# Patient Record
Sex: FEMALE | Race: WHITE | Employment: OTHER | RURAL
[De-identification: names, ages, dates, MRNs, and addresses within clinical notes are randomized per-mention and may not be internally consistent; named-entity substitution may affect disease eponyms.]

---

## 2020-01-15 ENCOUNTER — OFFICE VISIT (OUTPATIENT)
Dept: CARDIOLOGY CLINIC | Age: 75
End: 2020-01-15

## 2020-01-15 VITALS
RESPIRATION RATE: 14 BRPM | OXYGEN SATURATION: 98 % | SYSTOLIC BLOOD PRESSURE: 120 MMHG | DIASTOLIC BLOOD PRESSURE: 70 MMHG | HEART RATE: 80 BPM | WEIGHT: 129 LBS | HEIGHT: 62 IN | BODY MASS INDEX: 23.74 KG/M2

## 2020-01-15 DIAGNOSIS — I07.1 RHEUMATIC TRICUSPID VALVE REGURGITATION: ICD-10-CM

## 2020-01-15 DIAGNOSIS — Z98.890 S/P TVR (TRICUSPID VALVE REPAIR): ICD-10-CM

## 2020-01-15 DIAGNOSIS — Z95.2 S/P MVR (MITRAL VALVE REPLACEMENT): ICD-10-CM

## 2020-01-15 DIAGNOSIS — I05.9 MITRAL VALVE DISEASE: Primary | ICD-10-CM

## 2020-01-15 DIAGNOSIS — I48.0 PAF (PAROXYSMAL ATRIAL FIBRILLATION) (HCC): ICD-10-CM

## 2020-01-15 DIAGNOSIS — R00.2 PALPITATIONS: ICD-10-CM

## 2020-01-15 RX ORDER — METOPROLOL SUCCINATE 25 MG/1
25 TABLET, EXTENDED RELEASE ORAL DAILY
COMMUNITY
Start: 2019-10-16 | End: 2020-10-15 | Stop reason: SDUPTHER

## 2020-01-15 RX ORDER — METOPROLOL SUCCINATE 50 MG/1
50 TABLET, EXTENDED RELEASE ORAL DAILY
COMMUNITY
Start: 2019-12-04 | End: 2020-07-15 | Stop reason: DRUGHIGH

## 2020-01-15 RX ORDER — ASPIRIN 81 MG/1
TABLET ORAL DAILY
COMMUNITY
End: 2020-10-15

## 2020-01-15 RX ORDER — LEVOTHYROXINE SODIUM 112 UG/1
112 TABLET ORAL DAILY
COMMUNITY
Start: 2019-12-04

## 2020-01-15 NOTE — PROGRESS NOTES
Verified patient with two patient identifiers. Medications reviewed/approved by Dr. Reyes Nielsen. Chief Complaint   Patient presents with    Valvular Heart Disease     1. Have you been to the ER, urgent care clinic since your last visit? Hospitalized since your last visit? new pt.     2. Have you seen or consulted any other health care providers outside of the [address] [place] [address] since your last visit? Include any pap smears or colon screening. New pt.

## 2020-01-15 NOTE — PROGRESS NOTES
Nixon Jones is a 69 y.o. female is here to establish local cardiac care. Hx rheumatic fever as child, RHD with mod-severe /MR w/ prolapse, TR mod-severe)--s/p MV replacement (Geovany Kennedyun #27 pericardial) + Tricuspid valve repair (annuloplasty ring)--!0/2/19 at [facility] (Dr Little Dave), transient periop PAF--to NSR, seen in f/u by Dr. Chelsea Vicente (Cardiology)--on warfarin x 3 mos, and ASA 81. Some tachy/palpitations and started low dose Toprol XL 25, increased to 75mg (sinus tachy). Echo 10/16/19 post op with LVEF 60, mild LAE, stable MV prosthesis and tricuspid annuloplasty. Occasional palpitations. Had 30 day Event monitor (returned on 12/30 but no results available currently to r/o afib and determine any need for ongoing warfarin/anticoag). The patient denies chest pain/ shortness of breath, orthopnea, PND, LE edema, syncope, presyncope or fatigue. 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 History reviewed. No pertinent family history.    Social History     Socioeconomic History    Marital status:      Spouse name: Not on file    Number of children: Not on file    Years of education: Not on file    Highest education level: Not on file   Occupational History    Not on file   Social Needs    Financial resource strain: Not on file    Food insecurity:     Worry: Not on file     Inability: Not on file    Transportation needs:     Medical: Not on file     Non-medical: Not on file   Tobacco Use    Smoking status: Never Smoker    Smokeless tobacco: Never Used   Substance and Sexual Activity    Alcohol use: Not on file    Drug use: Not on file    Sexual activity: Not on file   Lifestyle    Physical activity:     Days per week: Not on file     Minutes per session: Not on file    Stress: Not on file   Relationships    Social connections:     Talks on phone: Not on file     Gets together: Not on file     Attends Sabianist service: Not on file     Active member of club or organization: Not on file     Attends meetings of clubs or organizations: Not on file     Relationship status: Not on file    Intimate partner violence:     Fear of current or ex partner: Not on file     Emotionally abused: Not on file     Physically abused: Not on file     Forced sexual activity: Not on file   Other Topics Concern    Not on file   Social History Narrative    Not on file      Current Outpatient Medications   Medication Sig    aspirin delayed-release 81 mg tablet Take  by mouth daily.  metoprolol succinate (TOPROL-XL) 50 mg XL tablet Take 50 mg by mouth daily. Pt takes a 25 mg + 50 mg to equal 75 mg daily.  metoprolol succinate (TOPROL-XL) 25 mg XL tablet Take 25 mg by mouth daily. Pt takes a 25 mg + 50 mg to equal 75 mg daily.  SYNTHROID 100 mcg tablet Take 100 mcg by mouth daily.  warfarin sodium (WARFARIN PO) Take  by mouth daily. As directed per pcp. Alternates between 6 mg and 5 mg. No current facility-administered medications for this visit. Review of Symptoms:    CONST  No weight change.  No fever, chills, sweats    ENT No visual changes, URI sx, sore throat    CV  See HPI   RESP  No cough, or sputum, wheezing. Also see HPI   GI  No abdominal pain or change in bowel habits. No heartburn or dysphagia. No melena or rectal bleeding.   No dysuria, urgency, frequency, hematuria   MSKEL  No joint pain, swelling. No muscle pain. SKIN  No rash or lesions. NEURO  No headache, syncope, or seizure. No weakness, loss of sensation, or paresthesias. PSYCH  No low mood or depression  No anxiety. HE/LYMPH  No easy bruising, abnormal bleeding, or enlarged glands. Physical ExamPhysical Exam:    Visit Vitals  /70 (BP 1 Location: Left arm, BP Patient Position: Sitting)   Pulse 80   Resp 14   Ht 5' 2\" (1.575 m)   Wt 129 lb (58.5 kg)   SpO2 98% Comment: ra   BMI 23.59 kg/m²     Gen: NAD  HEENT:  PERRL, throat clear  Neck: no adenopathy, no thyromegaly, no JVD   Heart:  Regular,Nl S1S2,  no murmur, gallop or rub.   Lungs:  clear  Abdomen:   Soft, non-tender, bowel sounds are active.   Extremities:  No edema  Pulse: symmetric  Neuro: A&O times 3, No focal neuro deficits    Cardiographics    ECG: NSR, NST    Labs:   Lab Results   Component Value Date/Time    Sodium 142 09/24/2019 11:59 AM    Potassium 3.9 09/24/2019 11:59 AM    Chloride 104 09/24/2019 11:59 AM    CO2 29 09/24/2019 11:59 AM    Anion gap 9 09/24/2019 11:59 AM    Glucose 88 09/24/2019 11:59 AM    BUN 12 09/24/2019 11:59 AM    Creatinine 0.69 09/24/2019 11:59 AM    BUN/Creatinine ratio 17 09/24/2019 11:59 AM    GFR est AA >60 09/24/2019 11:59 AM    GFR est non-AA >60 09/24/2019 11:59 AM    Calcium 9.4 09/24/2019 11:59 AM     No results found for: CPK, CPKX, CPX  No results found for: CHOL, CHOLX, CHLST, CHOLV, 469391, HDL, HDLP, LDL, LDLC, DLDLP, TGLX, TRIGL, TRIGP, CHHD, CHHDX  No results found for this or any previous visit.     Assessment:         Patient Active Problem List    Diagnosis Date Noted    S/P MVR (mitral valve replacement) 01/15/2020    S/P TVR (tricuspid valve repair) 01/15/2020    PAF (paroxysmal atrial fibrillation) ([facility] 01/15/2020    Rheumatic heart disease     Mitral valve disease     Tricuspid regurgitation      69 y.o. female is here to establish local cardiac care. Hx rheumatic fever as child, RHD with mod-severe /MR w/ prolapse, TR mod-severe)--s/p MV replacement (Leatha Cue #27 pericardial) + Tricuspid valve repair (annuloplasty ring)--!0/2/19 at [facility] (Dr Sean Cardenas), transient periop PAF--to NSR, seen in f/u by Dr. Celestine Balderas (Cardiology)--on warfarin x 3 mos, and ASA 81. Some tachy/palpitations and started low dose Toprol XL 25, increased up to 75mg (sinus tachy). Echo 10/16/19 post op with LVEF 60, mild LAE, stable MV prosthesis and tricuspid annuloplasty. Occasional palpitations. Had 30 day Event monitor (returned on 12/30 but no results available currently to r/o afib and determine any need for ongoing warfarin/anticoag)     Plan:     Doing well with no adverse cardiac symptoms. Will f/u with her [facility] cardiologist on Monday to discuss Event monitor--if no afib planning to stop warfarin at 3 mos post-op, continue ASA 81  Reduce the Metoprolol XL to 50 (now on 75)  Is establishing local primary care  Continue current care and f/u in 6 months.     Logan Lipscomb MD

## 2020-01-15 NOTE — PATIENT INSTRUCTIONS
Please have your cardiology group in [place] fax over your event monitor results or you can drop them off at Dr. Conrado Latham office. FAX [phone] Please reduce your metoprolol to 50 mg daily.

## 2020-07-15 ENCOUNTER — OFFICE VISIT (OUTPATIENT)
Dept: CARDIOLOGY CLINIC | Age: 75
End: 2020-07-15

## 2020-07-15 VITALS
OXYGEN SATURATION: 98 % | DIASTOLIC BLOOD PRESSURE: 84 MMHG | BODY MASS INDEX: 23.74 KG/M2 | SYSTOLIC BLOOD PRESSURE: 126 MMHG | HEIGHT: 62 IN | HEART RATE: 76 BPM | TEMPERATURE: 95.3 F | RESPIRATION RATE: 16 BRPM | WEIGHT: 129 LBS

## 2020-07-15 DIAGNOSIS — Z98.890 S/P TVR (TRICUSPID VALVE REPAIR): ICD-10-CM

## 2020-07-15 DIAGNOSIS — I48.0 PAF (PAROXYSMAL ATRIAL FIBRILLATION) (HCC): ICD-10-CM

## 2020-07-15 DIAGNOSIS — Z95.2 S/P MVR (MITRAL VALVE REPLACEMENT): ICD-10-CM

## 2020-07-15 DIAGNOSIS — I09.9 RHEUMATIC HEART DISEASE: Primary | ICD-10-CM

## 2020-07-15 NOTE — PROGRESS NOTES
Tim Pascual is a 69 y.o. female is here for routine f/u. Hx rheumatic fever as child, RHD with mod-severe /MR w/ prolapse, TR mod-severe)--s/p MV replacement (Bridget Amos #27 pericardial) + Tricuspid valve repair (annuloplasty ring)--!0/2/19 at [facility] (Dr Jim Cuello), transient periop PAF--to NSR, seen in f/u by Dr. Marielle Chávez (Cardiology)--on warfarin x 3 mos, and ASA 81. Some tachy/palpitations and started low dose Toprol XL 25, increased up to 75mg (sinus tachy). Echo 10/16/19 post op with LVEF 60, mild LAE, stable MV prosthesis and tricuspid annuloplasty. Occasional palpitations. Had 30 day Event monitor (returned on 12/30) with PAT/PAF self terminating episodes up to 15 beats. She did see Dr. Marielle Chávez again in follow-up 5/15/20. Was asked to continue warfarin + ASA 81, slong with Metoprolol XL. Had discussed possible EP referral at time of last visit with Dr. Marielle Chávez. The patient denies chest pain/ shortness of breath, orthopnea, PND, LE edema, palpitations, syncope, presyncope or fatigue.        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 No family history on file. Social History     Socioeconomic History    Marital status:      Spouse name: Not on file    Number of children: Not on file    Years of education: Not on file    Highest education level: Not on file   Occupational History    Not on file   Social Needs    Financial resource strain: Not on file    Food insecurity     Worry: Not on file     Inability: Not on file    Transportation needs     Medical: Not on file     Non-medical: Not on file   Tobacco Use    Smoking status: Never Smoker    Smokeless tobacco: Never Used   Substance and Sexual Activity    Alcohol use: Not on file    Drug use: Not on file    Sexual activity: Not on file   Lifestyle    Physical activity     Days per week: Not on file     Minutes per session: Not on file    Stress: Not on file   Relationships    Social connections     Talks on phone: Not on file     Gets together: Not on file     Attends Church service: Not on file     Active member of club or organization: Not on file     Attends meetings of clubs or organizations: Not on file     Relationship status: Not on file    Intimate partner violence     Fear of current or ex partner: Not on file     Emotionally abused: Not on file     Physically abused: Not on file     Forced sexual activity: Not on file   Other Topics Concern    Not on file   Social History Narrative    Not on file      Current Outpatient Medications   Medication Sig    aspirin delayed-release 81 mg tablet Take  by mouth daily.  metoprolol succinate (TOPROL-XL) 25 mg XL tablet Take 25 mg by mouth daily.  SYNTHROID 100 mcg tablet Take 100 mcg by mouth daily.  warfarin sodium (WARFARIN PO) Take  by mouth daily. As directed per pcp. Alternates between 6 mg and 5 mg. No current facility-administered medications for this visit. Review of Symptoms:    CONST  No weight change.  No fever, chills, sweats    ENT No visual changes, URI sx, sore throat    CV  See HPI   RESP  No cough, or sputum, wheezing. Also see HPI   GI  No abdominal pain or change in bowel habits. No heartburn or dysphagia. No melena or rectal bleeding.   No dysuria, urgency, frequency, hematuria   MSKEL  No joint pain, swelling. No muscle pain. SKIN  No rash or lesions. NEURO  No headache, syncope, or seizure. No weakness, loss of sensation, or paresthesias. PSYCH  No low mood or depression  No anxiety. HE/LYMPH  No easy bruising, abnormal bleeding, or enlarged glands. Physical ExamPhysical Exam:    Visit Vitals  /84 (BP 1 Location: Left arm, BP Patient Position: Sitting)   Pulse 76   Temp (!) 95.3 °F (35.2 °C) (Temporal)   Resp 16   Ht 5' 2\" (1.575 m)   Wt 129 lb (58.5 kg)   SpO2 98% Comment: ra   BMI 23.59 kg/m²     Gen: NAD  HEENT:  PERRL, throat clear  Neck: no adenopathy, no thyromegaly, no JVD   Heart:  Regular,Nl S1S2,  I/VI murmur, no gallop or rubs  Lungs:  clear  Abdomen:   Soft, non-tender, bowel sounds are active. Extremities:  No edema  Pulse: symmetric  Neuro: A&O times 3, No focal neuro deficits    Cardiographics    ECG: NSR, borderline LAE, otherwise wnl    Labs:   Lab Results   Component Value Date/Time    Sodium 142 09/24/2019 11:59 AM    Potassium 3.9 09/24/2019 11:59 AM    Chloride 104 09/24/2019 11:59 AM    CO2 29 09/24/2019 11:59 AM    Anion gap 9 09/24/2019 11:59 AM    Glucose 88 09/24/2019 11:59 AM    BUN 12 09/24/2019 11:59 AM    Creatinine 0.69 09/24/2019 11:59 AM    BUN/Creatinine ratio 17 09/24/2019 11:59 AM    GFR est AA >60 09/24/2019 11:59 AM    GFR est non-AA >60 09/24/2019 11:59 AM    Calcium 9.4 09/24/2019 11:59 AM     No results found for: CPK, CPKX, CPX  No results found for: CHOL, CHOLX, CHLST, CHOLV, 334706, HDL, HDLP, LDL, LDLC, DLDLP, TGLX, TRIGL, TRIGP, CHHD, CHHDX  No results found for this or any previous visit.     Assessment:         Patient Active Problem List    Diagnosis Date Noted    S/P MVR (mitral valve replacement) 01/15/2020    S/P TVR (tricuspid valve repair) 01/15/2020    PAF (paroxysmal atrial fibrillation) ([address] 01/15/2020    Rheumatic heart disease     Mitral valve disease     Tricuspid regurgitation       Hx rheumatic fever as child, RHD with mod-severe /MR w/ prolapse, TR mod-severe)--s/p MV replacement (Carljenae David #27 pericardial) + Tricuspid valve repair (annuloplasty ring)--!0/2/19 at [facility] (Dr Marc Arevalo), transient periop PAF--to NSR, seen in f/u by Dr. Hermelinda Guerra (Cardiology)--on warfarin x 3 mos, and ASA 81. Some tachy/palpitations and started low dose Toprol XL 25, increased up to 75mg (sinus tachy). Echo 10/16/19 post op with LVEF 60, mild LAE, stable MV prosthesis and tricuspid annuloplasty. Occasional palpitations. Had 30 day Event monitor (returned on 12/30) with PAT/PAF self terminating episodes up to 15 beats. She did see Dr. Hermelinda Guerra again in follow-up 5/15/20. Was asked to continue warfarin + ASA 81, slong with Metoprolol XL. Had discussed possible EP referral at time of last visit with Dr. Hermelinda Guerra )? ILR vs other, PAF). Back down to 25mg of the Metoprolol XL. She did have a sleep study done 2/21/19 to r/o IJEOMA. Plan:     Discussed options re: PAF--minimal to no sx, CHADs2-VASC 3--favor long term anticoag and stopping ASA. Her Cardiologist in [place] had discussed possible EPS, Implantable loop. [employer] type devices also discussed. She is not currently interested in invasive EP procedure. May be candidate for Eliquis vs Xarelto to replace warfarin/ASA.     Abhi Solitario MD

## 2020-07-15 NOTE — PROGRESS NOTES
Verified patient with two patient identifiers. Medications reviewed/approved by Dr. Alcides Pinzon. 1. Have you been to the ER, urgent care clinic since your last visit? Hospitalized since your last visit? no    2. Have you seen or consulted any other health care providers outside of the [address] [place] [address] since your last visit? Include any pap smears or colon screening. Yes, DR. Grewal 5/15/2020 CARDIOLOGIST IN [place], [place]

## 2020-08-12 ENCOUNTER — TELEPHONE (OUTPATIENT)
Dept: CARDIOLOGY CLINIC | Age: 75
End: 2020-08-12

## 2020-08-12 NOTE — TELEPHONE ENCOUNTER
At [address] discussed with Dr. Amanda Hicks about coming off of the Warfarin and going onto Eliquis. She would like for someone to call her to get a script for the new medications either called or mailed into her pharmacy.  getts her medicine for [facility] in [facility] but she mentioned something about the cost so I am not sure if there is a mail order that she wants to used.   Please call [phone]

## 2020-08-13 NOTE — TELEPHONE ENCOUNTER
MD Ramana Nichole LPN    Caller: Unspecified (Yesterday,  3:50 PM)               Eliquis 5mg bid---D: 180 R:1 can send rx's.  Hold warfarin 2 days prior to starting. [facility]    Verbal order per Dr. Judit Silva. Order (medication, dose, route, frequency, amount, refills) repeated and verified twice. Spoke with the patient. Verified patient with two patient identifiers. Medication order given and questions answered. Patient verbalized understanding.

## 2020-10-15 ENCOUNTER — OFFICE VISIT (OUTPATIENT)
Dept: CARDIOLOGY CLINIC | Age: 75
End: 2020-10-15
Payer: MEDICARE

## 2020-10-15 VITALS
WEIGHT: 131 LBS | HEIGHT: 62 IN | BODY MASS INDEX: 24.11 KG/M2 | OXYGEN SATURATION: 98 % | TEMPERATURE: 98.2 F | SYSTOLIC BLOOD PRESSURE: 122 MMHG | DIASTOLIC BLOOD PRESSURE: 74 MMHG | HEART RATE: 75 BPM | RESPIRATION RATE: 16 BRPM

## 2020-10-15 DIAGNOSIS — Z95.2 S/P MVR (MITRAL VALVE REPLACEMENT): ICD-10-CM

## 2020-10-15 DIAGNOSIS — Z98.890 S/P TVR (TRICUSPID VALVE REPAIR): ICD-10-CM

## 2020-10-15 DIAGNOSIS — I48.0 PAF (PAROXYSMAL ATRIAL FIBRILLATION) (HCC): ICD-10-CM

## 2020-10-15 DIAGNOSIS — I09.9 RHEUMATIC HEART DISEASE: Primary | ICD-10-CM

## 2020-10-15 PROCEDURE — 1090F PRES/ABSN URINE INCON ASSESS: CPT | Performed by: INTERNAL MEDICINE

## 2020-10-15 PROCEDURE — 99214 OFFICE O/P EST MOD 30 MIN: CPT | Performed by: INTERNAL MEDICINE

## 2020-10-15 PROCEDURE — G8427 DOCREV CUR MEDS BY ELIG CLIN: HCPCS | Performed by: INTERNAL MEDICINE

## 2020-10-15 PROCEDURE — G8400 PT W/DXA NO RESULTS DOC: HCPCS | Performed by: INTERNAL MEDICINE

## 2020-10-15 PROCEDURE — 1100F PTFALLS ASSESS-DOCD GE2>/YR: CPT | Performed by: INTERNAL MEDICINE

## 2020-10-15 PROCEDURE — G8432 DEP SCR NOT DOC, RNG: HCPCS | Performed by: INTERNAL MEDICINE

## 2020-10-15 PROCEDURE — 3288F FALL RISK ASSESSMENT DOCD: CPT | Performed by: INTERNAL MEDICINE

## 2020-10-15 PROCEDURE — 3017F COLORECTAL CA SCREEN DOC REV: CPT | Performed by: INTERNAL MEDICINE

## 2020-10-15 PROCEDURE — G8420 CALC BMI NORM PARAMETERS: HCPCS | Performed by: INTERNAL MEDICINE

## 2020-10-15 PROCEDURE — G8536 NO DOC ELDER MAL SCRN: HCPCS | Performed by: INTERNAL MEDICINE

## 2020-10-15 NOTE — PROGRESS NOTES
Verified patient with two patient identifiers. Medications reviewed/approved by Dr. Colten Zhang. Chief Complaint   Patient presents with    Irregular Heart Beat     3 month follow up    Valvular Heart Disease     1. Have you been to the ER, urgent care clinic since your last visit? Hospitalized since your last visit?no    2. Have you seen or consulted any other health care providers outside of the [address] [place] [address] since your last visit? Include any pap smears or colon screening.  no

## 2020-10-15 NOTE — PROGRESS NOTES
Flavio House is a 70 y.o. female is here for routine f/u. Hx rheumatic fever as child, RHD with mod-severe /MR w/ prolapse, TR mod-severe)--s/p MV replacement (Henderson Tolu #27 pericardial) + Tricuspid valve repair (annuloplasty ring)--!0/2/19 at [facility] (Dr Femi Hooker), transient periop PAF--to NSR, seen in f/u by Dr. Fady Hope (Cardiology)--on warfarin x 3 mos, and ASA 81.  Some tachy/palpitations and started low dose Toprol XL 25, increased up to 75mg (sinus tachy).  Echo 10/16/19 post op with LVEF 60, mild LAE, stable MV prosthesis and tricuspid annuloplasty.  Occasional palpitations.  Had 30 day Event monitor (returned on 12/30) with PAT/PAF self terminating episodes up to 15 beats. She did see Dr. Fady Hope again in follow-up 5/15/20. Was asked to continue warfarin + ASA 81, slong with Metoprolol XL. Had discussed possible EP referral at time of last visit with Dr. Fady Hope )? ILR vs other, PAF). Back down to 25mg of the Metoprolol XL. She did have a sleep study done 2/21/19 to r/o IJEOMA. The patient denies chest pain/ shortness of breath, orthopnea, PND, LE edema, palpitations, syncope, presyncope or fatigue.        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 History reviewed. No pertinent family history. Social History     Socioeconomic History    Marital status:      Spouse name: Not on file    Number of children: Not on file    Years of education: Not on file    Highest education level: Not on file   Occupational History    Not on file   Social Needs    Financial resource strain: Not on file    Food insecurity     Worry: Not on file     Inability: Not on file    Transportation needs     Medical: Not on file     Non-medical: Not on file   Tobacco Use    Smoking status: Never Smoker    Smokeless tobacco: Never Used   Substance and Sexual Activity    Alcohol use: Not on file    Drug use: Not on file    Sexual activity: Not on file   Lifestyle    Physical activity     Days per week: Not on file     Minutes per session: Not on file    Stress: Not on file   Relationships    Social connections     Talks on phone: Not on file     Gets together: Not on file     Attends [employer] service: Not on file     Active member of club or organization: Not on file     Attends meetings of clubs or organizations: Not on file     Relationship status: Not on file    Intimate partner violence     Fear of current or ex partner: Not on file     Emotionally abused: Not on file     Physically abused: Not on file     Forced sexual activity: Not on file   Other Topics Concern    Not on file   Social History Narrative    Not on file      Current Outpatient Medications   Medication Sig    apixaban (Eliquis) 5 mg tablet Take 1 Tab by mouth two (2) times a day.  metoprolol succinate (TOPROL-XL) 25 mg XL tablet Take 25 mg by mouth daily.  SYNTHROID 100 mcg tablet Take 100 mcg by mouth daily. No current facility-administered medications for this visit. Review of Symptoms:    CONST  No weight change.  No fever, chills, sweats    ENT No visual changes, URI sx, sore throat    CV  See HPI   RESP  No cough, or sputum, wheezing. Also see HPI   GI  No abdominal pain or change in bowel habits. No heartburn or dysphagia. No melena or rectal bleeding.   No dysuria, urgency, frequency, hematuria   MSKEL  No joint pain, swelling. No muscle pain. SKIN  No rash or lesions. NEURO  No headache, syncope, or seizure. No weakness, loss of sensation, or paresthesias. PSYCH  No low mood or depression  No anxiety. HE/LYMPH  No easy bruising, abnormal bleeding, or enlarged glands. Physical ExamPhysical Exam:    Visit Vitals  /74 (BP 1 Location: Left arm, BP Patient Position: Sitting)   Pulse 75   Temp 98.2 °F (36.8 °C) (Temporal)   Resp 16   Ht 5' 2\" (1.575 m)   Wt 131 lb (59.4 kg)   SpO2 98% Comment: ra   BMI 23.96 kg/m²     Gen: NAD  HEENT:  PERRL, throat clear  Neck: no adenopathy, no thyromegaly, no JVD   Heart:  Regular,Nl S1S2,  no murmur, gallop or rub. Lungs:  Clear healed sternotomy  Abdomen:   Soft, non-tender, bowel sounds are active. Extremities:  No edema  Pulse: symmetric  Neuro: A&O times 3, No focal neuro deficits    Labs:   Lab Results   Component Value Date/Time    Sodium 142 09/24/2019 11:59 AM    Potassium 3.9 09/24/2019 11:59 AM    Chloride 104 09/24/2019 11:59 AM    CO2 29 09/24/2019 11:59 AM    Anion gap 9 09/24/2019 11:59 AM    Glucose 88 09/24/2019 11:59 AM    BUN 12 09/24/2019 11:59 AM    Creatinine 0.69 09/24/2019 11:59 AM    BUN/Creatinine ratio 17 09/24/2019 11:59 AM    GFR est AA >60 09/24/2019 11:59 AM    GFR est non-AA >60 09/24/2019 11:59 AM    Calcium 9.4 09/24/2019 11:59 AM     No results found for: CPK, CPKX, CPX  No results found for: CHOL, CHOLX, CHLST, CHOLV, 517114, HDL, HDLP, LDL, LDLC, DLDLP, TGLX, TRIGL, TRIGP, CHHD, CHHDX  No results found for this or any previous visit.     Assessment:         Patient Active Problem List    Diagnosis Date Noted    S/P MVR (mitral valve replacement) 01/15/2020    S/P TVR (tricuspid valve repair) 01/15/2020    PAF (paroxysmal atrial fibrillation) ([address] 01/15/2020    Rheumatic heart disease     Mitral valve disease     Tricuspid regurgitation      Hx rheumatic fever as child, RHD with mod-severe /MR w/ prolapse, TR mod-severe)--s/p MV replacement (Christopher Bolivar #27 pericardial) + Tricuspid valve repair (annuloplasty ring)--!0/2/19 at [facility] (Dr Stacy Ortiz), transient periop PAF--to NSR, seen in f/u by Dr. Scott Martines (Cardiology)--on warfarin x 3 mos, and ASA 81.  Some tachy/palpitations and started low dose Toprol XL 25, increased up to 75mg (sinus tachy).  Echo 10/16/19 post op with LVEF 60, mild LAE, stable MV prosthesis and tricuspid annuloplasty.  Occasional palpitations.  Had 30 day Event monitor (returned on 12/30) with PAT/PAF self terminating episodes up to 15 beats. She did see Dr. Scott Martines again in follow-up 5/15/20. Was asked to continue warfarin + ASA 81, slong with Metoprolol XL. Had discussed possible EP referral at time of last visit with Dr. Scott Martines )? ILR vs other, PAF). Back down to 25mg of the Metoprolol XL. She did have a sleep study done 2/21/19 to r/o IJEOMA.      Plan:     Doing well, no recurrence of afib  Stable on low dose Metoprolol and Eliquis (PAF)  Fu with PCP as planned  RTC 6 mos, sooner przenia Segundo MD

## 2020-10-16 RX ORDER — METOPROLOL SUCCINATE 25 MG/1
25 TABLET, EXTENDED RELEASE ORAL DAILY
Qty: 90 TAB | Refills: 1 | Status: SHIPPED | OUTPATIENT
Start: 2020-10-16 | End: 2021-04-12

## 2021-02-01 ENCOUNTER — TELEPHONE (OUTPATIENT)
Dept: CARDIOLOGY CLINIC | Age: 76
End: 2021-02-01

## 2021-02-01 NOTE — TELEPHONE ENCOUNTER
Pt needs her Eliquis script called in to [phone] [facility].   Please call if you have any questions [phone]

## 2021-02-03 ENCOUNTER — TELEPHONE (OUTPATIENT)
Dept: CARDIOLOGY CLINIC | Age: 76
End: 2021-02-03

## 2021-02-03 NOTE — TELEPHONE ENCOUNTER
apixaban (Eliquis) 5 mg tablet [[id number]]     Order Details  Dose: 5 mg Route: Oral Frequency: 2 TIMES DAILY   Dispense Quantity: 180 Tab Refills: 0    Note to Pharmacy: WARFARIN D/C'D.         Sig: Take 1 Tab by mouth two (2) times a day.         Start Date: 02/01/21 End Date: --   Written Date: 02/01/21 Expiration Date: --     Corrected pharmacy. Verbal order per Dr. Jose C Sweet. Order (medication, dose, route, frequency, amount, refills) repeated and verified twice.

## 2021-02-03 NOTE — TELEPHONE ENCOUNTER
This script (Eliquis 5mg)was sent to the wrong Pharmacy on 02/01/2021.    Doc Tru Sees sent it to [facility] and it needs to go to [facility]

## 2021-04-15 ENCOUNTER — OFFICE VISIT (OUTPATIENT)
Dept: CARDIOLOGY CLINIC | Age: 76
End: 2021-04-15
Payer: MEDICARE

## 2021-04-15 VITALS
OXYGEN SATURATION: 100 % | DIASTOLIC BLOOD PRESSURE: 82 MMHG | BODY MASS INDEX: 23.92 KG/M2 | WEIGHT: 130 LBS | TEMPERATURE: 97.6 F | RESPIRATION RATE: 16 BRPM | HEART RATE: 72 BPM | SYSTOLIC BLOOD PRESSURE: 150 MMHG | HEIGHT: 62 IN

## 2021-04-15 DIAGNOSIS — Z98.890 S/P TVR (TRICUSPID VALVE REPAIR): ICD-10-CM

## 2021-04-15 DIAGNOSIS — Z95.2 S/P MVR (MITRAL VALVE REPLACEMENT): ICD-10-CM

## 2021-04-15 DIAGNOSIS — I48.0 PAF (PAROXYSMAL ATRIAL FIBRILLATION) (HCC): ICD-10-CM

## 2021-04-15 DIAGNOSIS — I09.9 RHEUMATIC HEART DISEASE: Primary | ICD-10-CM

## 2021-04-15 PROCEDURE — G8536 NO DOC ELDER MAL SCRN: HCPCS | Performed by: INTERNAL MEDICINE

## 2021-04-15 PROCEDURE — G8420 CALC BMI NORM PARAMETERS: HCPCS | Performed by: INTERNAL MEDICINE

## 2021-04-15 PROCEDURE — 3017F COLORECTAL CA SCREEN DOC REV: CPT | Performed by: INTERNAL MEDICINE

## 2021-04-15 PROCEDURE — 93000 ELECTROCARDIOGRAM COMPLETE: CPT | Performed by: INTERNAL MEDICINE

## 2021-04-15 PROCEDURE — G8427 DOCREV CUR MEDS BY ELIG CLIN: HCPCS | Performed by: INTERNAL MEDICINE

## 2021-04-15 PROCEDURE — 1101F PT FALLS ASSESS-DOCD LE1/YR: CPT | Performed by: INTERNAL MEDICINE

## 2021-04-15 PROCEDURE — 99214 OFFICE O/P EST MOD 30 MIN: CPT | Performed by: INTERNAL MEDICINE

## 2021-04-15 PROCEDURE — G8400 PT W/DXA NO RESULTS DOC: HCPCS | Performed by: INTERNAL MEDICINE

## 2021-04-15 PROCEDURE — G8432 DEP SCR NOT DOC, RNG: HCPCS | Performed by: INTERNAL MEDICINE

## 2021-04-15 PROCEDURE — 1090F PRES/ABSN URINE INCON ASSESS: CPT | Performed by: INTERNAL MEDICINE

## 2021-04-15 NOTE — PROGRESS NOTES
Verified patient with two patient identifiers. Medications reviewed/approved by Dr. Mega Chavarria. Chief Complaint   Patient presents with    Irregular Heart Beat     6 month follow up    Valvular Heart Disease     1. Have you been to the ER, urgent care clinic since your last visit? Hospitalized since your last visit?no    2. Have you seen or consulted any other health care providers outside of the [address] [place] [address] since your last visit? Include any pap smears or colon screening.   Yes, 3/19/21 for oac, establish care IVAN Campbell.

## 2021-04-15 NOTE — PROGRESS NOTES
Goldie Alexander is a 70 y.o. female is here for routine f/u. Hx rheumatic fever as child, RHD with mod-severe /MR w/ prolapse, TR mod-severe)--s/p MV replacement ([place] #27 pericardial) + Tricuspid valve repair (annuloplasty ring)--!0/2/19 at [facility] (Dr Joe Oconnor), transient periop PAF--to NSR, seen in f/u by Dr. Pa Singer (Cardiology)--on warfarin x 3 mos, and ASA 81.  Some tachy/palpitations and started low dose Toprol XL 25, increased up to 75mg (sinus tachy).  Echo 10/16/19 post op with LVEF 60, mild LAE, stable MV prosthesis and tricuspid annuloplasty.  Occasional palpitations.  Had 30 day Event monitor (returned on 12/30) with PAT/PAF self terminating episodes up to 15 beats.  She did see Dr. Pa Singer again in follow-up 5/15/20. Was asked to continue warfarin + ASA 81, slong with Metoprolol XL. Had discussed possible EP referral at time of last visit with Dr. Pa Singer )? ILR vs other, PAF).  Back down to 25mg of the Metoprolol XL. She did have a sleep study done 2/21/19 to r/o IJEOMA. The patient denies chest pain/ shortness of breath, orthopnea, PND, LE edema, palpitations, syncope, presyncope or fatigue.        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 No family history on file. Social History     Socioeconomic History    Marital status:      Spouse name: Not on file    Number of children: Not on file    Years of education: Not on file    Highest education level: Not on file   Occupational History    Not on file   Social Needs    Financial resource strain: Not on file    Food insecurity     Worry: Not on file     Inability: Not on file    Transportation needs     Medical: Not on file     Non-medical: Not on file   Tobacco Use    Smoking status: Never Smoker    Smokeless tobacco: Never Used   Substance and Sexual Activity    Alcohol use: Not on file    Drug use: Not on file    Sexual activity: Not on file   Lifestyle    Physical activity     Days per week: Not on file     Minutes per session: Not on file    Stress: Not on file   Relationships    Social connections     Talks on phone: Not on file     Gets together: Not on file     Attends [employer] service: Not on file     Active member of club or organization: Not on file     Attends meetings of clubs or organizations: Not on file     Relationship status: Not on file    Intimate partner violence     Fear of current or ex partner: Not on file     Emotionally abused: Not on file     Physically abused: Not on file     Forced sexual activity: Not on file   Other Topics Concern    Not on file   Social History Narrative    Not on file      Current Outpatient Medications   Medication Sig    metoprolol succinate (TOPROL-XL) 25 mg XL tablet TAKE 1 TABLET BY MOUTH ONCE DAILY    apixaban (Eliquis) 5 mg tablet Take 1 Tab by mouth two (2) times a day. Indications: treatment to prevent blood clots in chronic atrial fibrillation    Synthroid 112 mcg tablet Take 112 mcg by mouth daily. No current facility-administered medications for this visit.           Review of Symptoms:    CONST  No weight change. No fever, chills, sweats    ENT No visual changes, URI sx, sore throat    CV  See HPI   RESP  No cough, or sputum, wheezing. Also see HPI   GI  No abdominal pain or change in bowel habits. No heartburn or dysphagia. No melena or rectal bleeding.   No dysuria, urgency, frequency, hematuria   MSKEL  No joint pain, swelling. No muscle pain. SKIN  No rash or lesions. NEURO  No headache, syncope, or seizure. No weakness, loss of sensation, or paresthesias. PSYCH  No low mood or depression  No anxiety. HE/LYMPH  No easy bruising, abnormal bleeding, or enlarged glands. Physical ExamPhysical Exam:    Visit Vitals  BP (!) 150/82 (BP 1 Location: Left upper arm, BP Patient Position: Sitting, BP Cuff Size: Adult)   Pulse 72   Temp 97.6 °F (36.4 °C) (Temporal)   Resp 16   Ht 5' 2\" (1.575 m)   Wt 130 lb (59 kg)   SpO2 100% Comment: ra   BMI 23.78 kg/m²     Gen: NAD  HEENT:  PERRL, throat clear  Neck: no adenopathy, no thyromegaly, no JVD   Heart:  Regular,Nl S1S2,  I/VI murmur, no gallop or rub. Lungs:  clear  Abdomen:   Soft, non-tender, bowel sounds are active.    Extremities:  No edema  Pulse: symmetric  Neuro: A&O times 3, No focal neuro deficits    Cardiographics    ECG:NSR, NST, no acute changes      Labs:   Lab Results   Component Value Date/Time    Sodium 142 09/24/2019 11:59 AM    Potassium 3.9 09/24/2019 11:59 AM    Chloride 104 09/24/2019 11:59 AM    CO2 29 09/24/2019 11:59 AM    Anion gap 9 09/24/2019 11:59 AM    Glucose 88 09/24/2019 11:59 AM    BUN 12 09/24/2019 11:59 AM    Creatinine 0.69 09/24/2019 11:59 AM    BUN/Creatinine ratio 17 09/24/2019 11:59 AM    GFR est AA >60 09/24/2019 11:59 AM    GFR est non-AA >60 09/24/2019 11:59 AM    Calcium 9.4 09/24/2019 11:59 AM     No results found for: CPK, CPKX, CPX  No results found for: CHOL, CHOLX, CHLST, CHOLV, 995687, HDL, HDLP, LDL, LDLC, DLDLP, TGLX, TRIGL, TRIGP, CHHD, CHHDX  No results found for this or any previous visit. Assessment:         Patient Active Problem List    Diagnosis Date Noted    S/P MVR (mitral valve replacement) 01/15/2020    S/P TVR (tricuspid valve repair) 01/15/2020    PAF (paroxysmal atrial fibrillation) ([facility] [address] 01/15/2020    Rheumatic heart disease     Mitral valve disease     Tricuspid regurgitation      Hx rheumatic fever as child, RHD with mod-severe /MR w/ prolapse, TR mod-severe)--s/p MV replacement (Sonia Copiah #27 pericardial) + Tricuspid valve repair (annuloplasty ring)--!0/2/19 at [facility] (Dr Malina Patel), transient periop PAF--to NSR, seen in f/u by Dr. Rubi Dale (Cardiology)--on warfarin x 3 mos, and ASA 81.  Some tachy/palpitations and started low dose Toprol XL 25, increased up to 75mg (sinus tachy).  Echo 10/16/19 post op with LVEF 60, mild LAE, stable MV prosthesis and tricuspid annuloplasty.  Occasional palpitations.  Had 30 day Event monitor (returned on 12/30) with PAT/PAF self terminating episodes up to 15 beats.  She did see Dr. Rubi Dale again in follow-up 5/15/20. Was asked to continue warfarin + ASA 81, slong with Metoprolol XL. Had discussed possible EP referral at time of last visit with Dr. Rubi Dale )? ILR vs other, PAF).  Back down to 25mg of the Metoprolol XL. She did have a sleep study done 2/21/19 to r/o IJEOMA. Plan:     Doing well with no adverse cardiac symptoms. Lipids and labs followed by PCP. Continue current care and f/u in 6 months.     Arline Saeed MD

## 2021-09-27 RX ORDER — METOPROLOL SUCCINATE 25 MG/1
TABLET, EXTENDED RELEASE ORAL
Qty: 90 TABLET | Refills: 1 | Status: SHIPPED | OUTPATIENT
Start: 2021-09-27 | End: 2022-04-04

## 2021-10-21 ENCOUNTER — OFFICE VISIT (OUTPATIENT)
Dept: CARDIOLOGY CLINIC | Age: 76
End: 2021-10-21
Payer: MEDICARE

## 2021-10-21 VITALS
TEMPERATURE: 98.3 F | BODY MASS INDEX: 23.92 KG/M2 | SYSTOLIC BLOOD PRESSURE: 140 MMHG | OXYGEN SATURATION: 98 % | DIASTOLIC BLOOD PRESSURE: 74 MMHG | HEIGHT: 62 IN | HEART RATE: 74 BPM | WEIGHT: 130 LBS | RESPIRATION RATE: 18 BRPM

## 2021-10-21 DIAGNOSIS — I09.9 RHEUMATIC HEART DISEASE: Primary | ICD-10-CM

## 2021-10-21 DIAGNOSIS — Z95.2 S/P MVR (MITRAL VALVE REPLACEMENT): ICD-10-CM

## 2021-10-21 DIAGNOSIS — Z98.890 S/P TVR (TRICUSPID VALVE REPAIR): ICD-10-CM

## 2021-10-21 DIAGNOSIS — I48.0 PAF (PAROXYSMAL ATRIAL FIBRILLATION) (HCC): ICD-10-CM

## 2021-10-21 PROCEDURE — G8536 NO DOC ELDER MAL SCRN: HCPCS | Performed by: INTERNAL MEDICINE

## 2021-10-21 PROCEDURE — 93000 ELECTROCARDIOGRAM COMPLETE: CPT | Performed by: INTERNAL MEDICINE

## 2021-10-21 PROCEDURE — G8400 PT W/DXA NO RESULTS DOC: HCPCS | Performed by: INTERNAL MEDICINE

## 2021-10-21 PROCEDURE — 1101F PT FALLS ASSESS-DOCD LE1/YR: CPT | Performed by: INTERNAL MEDICINE

## 2021-10-21 PROCEDURE — G8427 DOCREV CUR MEDS BY ELIG CLIN: HCPCS | Performed by: INTERNAL MEDICINE

## 2021-10-21 PROCEDURE — 99214 OFFICE O/P EST MOD 30 MIN: CPT | Performed by: INTERNAL MEDICINE

## 2021-10-21 PROCEDURE — 1090F PRES/ABSN URINE INCON ASSESS: CPT | Performed by: INTERNAL MEDICINE

## 2021-10-21 PROCEDURE — G8510 SCR DEP NEG, NO PLAN REQD: HCPCS | Performed by: INTERNAL MEDICINE

## 2021-10-21 PROCEDURE — G8420 CALC BMI NORM PARAMETERS: HCPCS | Performed by: INTERNAL MEDICINE

## 2021-10-21 PROCEDURE — 3017F COLORECTAL CA SCREEN DOC REV: CPT | Performed by: INTERNAL MEDICINE

## 2021-10-21 NOTE — PROGRESS NOTES
Identified pt with two pt identifiers(name and ). Reviewed record in preparation for visit and have obtained necessary documentation. Chief Complaint   Patient presents with    Irregular Heart Beat     6 MONTH FOLLOW UP    Valvular Heart Disease      Vitals:    10/21/21 0955   BP: (!) 140/74   Pulse: 74   Resp: 18   Temp: 98.3 °F (36.8 °C)   TempSrc: Temporal   SpO2: 98%   Weight: 130 lb (59 kg)   Height: 5' 2\" (1.575 m)   PainSc:   0 - No pain       Medications reviewed/approved by Dr. Marco Dickens. Health Maintenance Review: Patient reminded of \"due or due soon\" health maintenance. I have asked the patient to contact his/her primary care provider (PCP) for follow-up on his/her health maintenance. Coordination of Care Questionnaire:  :   1) Have you been to an emergency room, urgent care, or hospitalized since your last visit? If yes, where when, and reason for visit? no       2. Have seen or consulted any other health care provider since your last visit? If yes, where when, and reason for visit? NO      Patient is accompanied by self I have received verbal consent from [address] [place] [address] to discuss any/all medical information while they are present in the room.

## 2021-10-21 NOTE — PROGRESS NOTES
Yifan Medeiros is a 78 y.o. female is here for routine f/u. Hx rheumatic fever as child, RHD with mod-severe /MR w/ prolapse, TR mod-severe)--s/p MV replacement (Ardith Hint #27 pericardial) + Tricuspid valve repair (annuloplasty ring)--!0/2/19 at [facility] (Dr Afsaneh Martinez), transient periop PAF--to NSR, seen in f/u by Dr. Danyelle Vicente (Cardiology)--on warfarin x 3 mos, and ASA 81.  Some tachy/palpitations and started low dose Toprol XL 25, increased up to 75mg (sinus tachy).  Echo 10/16/19 post op with LVEF 60, mild LAE, stable MV prosthesis and tricuspid annuloplasty.  Occasional palpitations.  Had 30 day Event monitor (returned on 12/30) with PAT/PAF self terminating episodes up to 15 beats.  She did see Dr. Danyelle Vicente again in follow-up 5/15/20. Was asked to continue warfarin + ASA 81, slong with Metoprolol XL. Had discussed possible EP referral at time of last visit with Dr. Danyelle Vicnete )? ILR vs other, PAF).  Back down to 25mg of the Metoprolol XL. She did have a sleep study done 2/21/19 to r/o IJEOMA. The patient denies chest pain/ shortness of breath, orthopnea, PND, LE edema, syncope, presyncope or fatigue.        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 No family history on file. Social History     Socioeconomic History    Marital status:      Spouse name: Not on file    Number of children: Not on file    Years of education: Not on file    Highest education level: Not on file   Occupational History    Not on file   Tobacco Use    Smoking status: Never Smoker    Smokeless tobacco: Never Used   Substance and Sexual Activity    Alcohol use: Not on file    Drug use: Not on file    Sexual activity: Not on file   Other Topics Concern    Not on file   Social History Narrative    Not on file     Social Determinants of Health     Financial Resource Strain:     Difficulty of Paying Living Expenses:    Food Insecurity:     Worried About Running Out of Food in the Last Year:     [address] in the Last Year:    Transportation Needs:     Lack of Transportation (Medical):  Lack of Transportation (Non-Medical):    Physical Activity:     Days of Exercise per Week:     Minutes of Exercise per Session:    Stress:     Feeling of Stress :    Social Connections:     Frequency of Communication with Friends and Family:     Frequency of Social Gatherings with Friends and Family:     Attends Rastafari Services:     Active Member of Clubs or Organizations:     Attends Club or Organization Meetings:     Marital Status:    Intimate Partner Violence:     Fear of Current or Ex-Partner:     Emotionally Abused:     Physically Abused:     Sexually Abused:       Current Outpatient Medications   Medication Sig    metoprolol succinate (TOPROL-XL) 25 mg XL tablet TAKE 1 TABLET BY MOUTH ONCE DAILY    Eliquis 5 mg tablet TAKE 1 TABLET TWICE A DAY FOR TREATMENT TO PREVENT BLOOD CLOTS IN CHRONIC ATRIAL FIBRILLATION (WARFARIN DISCONTINUED)    Synthroid 112 mcg tablet Take 112 mcg by mouth daily.      No current facility-administered medications for this visit. Review of Symptoms:    CONST  No weight change. No fever, chills, sweats    ENT No visual changes, URI sx, sore throat    CV  See HPI   RESP  No cough, or sputum, wheezing. Also see HPI   GI  No abdominal pain or change in bowel habits. No heartburn or dysphagia. No melena or rectal bleeding.   No dysuria, urgency, frequency, hematuria   MSKEL  No joint pain, swelling. No muscle pain. SKIN  No rash or lesions. NEURO  No headache, syncope, or seizure. No weakness, loss of sensation, or paresthesias. PSYCH  No low mood or depression  No anxiety. HE/LYMPH  No easy bruising, abnormal bleeding, or enlarged glands. Physical ExamPhysical Exam:    Visit Vitals  Ht 5' 2\" (1.575 m)   BMI 23.78 kg/m²     Gen: NAD  HEENT:  PERRL, throat clear  Neck: no adenopathy, no thyromegaly, no JVD   Heart:  Regular,Nl S1S2,  II/VI murmur, no gallop or rub. Lungs:  clear  Abdomen:   Soft, non-tender, bowel sounds are active. Extremities:  No edema  Pulse: symmetric  Neuro: A&O times 3, No focal neuro deficits    Cardiographics    ECG: NSR, NST, no acute changes      Labs:   Lab Results   Component Value Date/Time    Sodium 142 09/24/2019 11:59 AM    Potassium 3.9 09/24/2019 11:59 AM    Chloride 104 09/24/2019 11:59 AM    CO2 29 09/24/2019 11:59 AM    Anion gap 9 09/24/2019 11:59 AM    Glucose 88 09/24/2019 11:59 AM    BUN 12 09/24/2019 11:59 AM    Creatinine 0.69 09/24/2019 11:59 AM    BUN/Creatinine ratio 17 09/24/2019 11:59 AM    GFR est AA >60 09/24/2019 11:59 AM    GFR est non-AA >60 09/24/2019 11:59 AM    Calcium 9.4 09/24/2019 11:59 AM     No results found for: CPK, CPKX, CPX  No results found for: CHOL, CHOLX, CHLST, CHOLV, 137966, HDL, HDLP, LDL, LDLC, DLDLP, TGLX, TRIGL, TRIGP, CHHD, CHHDX  No results found for this or any previous visit.     Assessment:         Patient Active Problem List    Diagnosis Date Noted    S/P MVR (mitral valve replacement) 01/15/2020  S/P TVR (tricuspid valve repair) 01/15/2020    PAF (paroxysmal atrial fibrillation) ([facility] 01/15/2020    Rheumatic heart disease     Mitral valve disease     Tricuspid regurgitation      Hx rheumatic fever as child, RHD with mod-severe /MR w/ prolapse, TR mod-severe)--s/p MV replacement (Heena Reid #27 pericardial) + Tricuspid valve repair (annuloplasty ring)--!0/2/19 at [facility] (Dr Claude Mg), transient periop PAF--to NSR, seen in f/u by Dr. Azul Neff (Cardiology)--on warfarin x 3 mos, and ASA 81.  Some tachy/palpitations and started low dose Toprol XL 25, increased up to 75mg (sinus tachy).  Echo 10/16/19 post op with LVEF 60, mild LAE, stable MV prosthesis and tricuspid annuloplasty.  Occasional palpitations.  Had 30 day Event monitor (returned on 12/30) with PAT/PAF self terminating episodes up to 15 beats.  She did see Dr. Azul Neff again in follow-up 5/15/20. Was asked to continue warfarin + ASA 81, slong with Metoprolol XL. Had discussed possible EP referral at time of last visit with Dr. Azul Neff )? ILR vs other, PAF).  Back down to 25mg of the Metoprolol XL. She did have a sleep study done 2/21/19 to r/o IJEOMA. Plan:     Doing well with no adverse cardiac symptoms. Echo--call w results  Continue current meds/rx   Lipids and labs followed by PCP. Continue current care and f/u in 6 months.     Marbella Hi MD

## 2021-10-28 ENCOUNTER — HOSPITAL ENCOUNTER (OUTPATIENT)
Dept: ULTRASOUND IMAGING | Age: 76
Discharge: HOME | End: 2021-10-28
Attending: INTERNAL MEDICINE
Payer: MEDICARE

## 2021-10-28 DIAGNOSIS — I48.0 PAF (PAROXYSMAL ATRIAL FIBRILLATION) (HCC): ICD-10-CM

## 2021-10-28 DIAGNOSIS — Z95.2 S/P MVR (MITRAL VALVE REPLACEMENT): ICD-10-CM

## 2021-10-28 DIAGNOSIS — Z98.890 S/P TVR (TRICUSPID VALVE REPAIR): ICD-10-CM

## 2021-10-28 DIAGNOSIS — I09.9 RHEUMATIC HEART DISEASE: ICD-10-CM

## 2021-10-28 LAB
AV R PG: 55.07 MMHG
ECHO AO ROOT DIAM: 3.32 CM
ECHO AR MAX VEL PISA: 371.01 CM/S
ECHO AV MEAN GRADIENT: 7.18 MMHG
ECHO AV PEAK GRADIENT: 11.98 MMHG
ECHO AV PEAK GRADIENT: 12.78 MMHG
ECHO AV PEAK VELOCITY: 173.02 CM/S
ECHO AV PEAK VELOCITY: 178.67 CM/S
ECHO AV REGURGITANT PHT: 590.2 MS
ECHO AV VTI: 46.16 CM
ECHO EST RA PRESSURE: 15 MMHG
ECHO LA AREA 4C: 19.76 CM2
ECHO LA MAJOR AXIS: 5.03 CM
ECHO LA VOL 2C: 81.26 ML (ref 22–52)
ECHO LA VOL 4C: 65.65 ML (ref 22–52)
ECHO LA VOL BP: 83.76 ML (ref 22–52)
ECHO LV E' SEPTAL VELOCITY: 8.36 CM/S
ECHO LV INTERNAL DIMENSION DIASTOLIC: 5.09 CM (ref 3.9–5.3)
ECHO LV INTERNAL DIMENSION SYSTOLIC: 3.27 CM
ECHO LV IVSD: 0.85 CM (ref 0.6–0.9)
ECHO LV MASS 2D: 160.7 G (ref 67–162)
ECHO LV POSTERIOR WALL DIASTOLIC: 0.93 CM (ref 0.6–0.9)
ECHO LVOT DIAM: 2.02 CM
ECHO MV A VELOCITY: 111.32 CM/S
ECHO MV E DECELERATION TIME (DT): 270.81 MS
ECHO MV E VELOCITY: 126.09 CM/S
ECHO MV E/A RATIO: 1.13
ECHO MV E/E' SEPTAL: 15.08
ECHO MV MAX VELOCITY: 155.46 CM/S
ECHO MV MEAN GRADIENT: 5.09 MMHG
ECHO MV PEAK GRADIENT: 9.67 MMHG
ECHO MV VTI: 55.56 CM
ECHO PV REGURGITANT MAX VELOCITY: 96.31 CM/S
ECHO RIGHT VENTRICULAR SYSTOLIC PRESSURE (RVSP): 40.3 MMHG
ECHO TV REGURGITANT MAX VELOCITY: 251.21 CM/S
ECHO TV REGURGITANT PEAK GRADIENT: 25.3 MMHG

## 2021-10-28 PROCEDURE — 93306 TTE W/DOPPLER COMPLETE: CPT

## 2021-10-28 PROCEDURE — 93306 TTE W/DOPPLER COMPLETE: CPT | Performed by: INTERNAL MEDICINE

## 2021-11-08 RX ORDER — APIXABAN 5 MG/1
TABLET, FILM COATED ORAL
Qty: 180 TABLET | Refills: 1 | Status: SHIPPED | OUTPATIENT
Start: 2021-11-08 | End: 2022-05-01

## 2022-03-18 PROBLEM — Z98.890 S/P TVR (TRICUSPID VALVE REPAIR): Status: ACTIVE | Noted: 2020-01-15

## 2022-03-19 PROBLEM — I48.0 PAF (PAROXYSMAL ATRIAL FIBRILLATION) (HCC): Status: ACTIVE | Noted: 2020-01-15

## 2022-03-19 PROBLEM — Z95.2 S/P MVR (MITRAL VALVE REPLACEMENT): Status: ACTIVE | Noted: 2020-01-15

## 2022-05-01 RX ORDER — APIXABAN 5 MG/1
TABLET, FILM COATED ORAL
Qty: 180 TABLET | Refills: 1 | Status: SHIPPED | OUTPATIENT
Start: 2022-05-01 | End: 2022-11-03

## 2022-05-19 NOTE — PROGRESS NOTES
Courtney Bernal is a 78 y.o. female is here for routine f/u. Last seen by Dr. Sarthak Camara in October 2021. Hx rheumatic fever as child, RHD with mod-severe /MR w/ prolapse, TR mod-severe)--s/p MV replacement (Bernadene Cull #27 pericardial) + Tricuspid valve repair (annuloplasty ring)--10/2/19 at [facility] (Dr Roby Kenney), transient periop PAF--to NSR, seen in f/u by Dr. Keri Sosa (Cardiology)--on warfarin x 3 mos, and ASA 81.  Some tachy/palpitations and started low dose Toprol XL 25, increased up to 75mg (sinus tachy).  Echo 10 2021 with LVEF 60, mild LAE, stable MV prosthesis and tricuspid annuloplasty.  Occasional palpitations.  Had 30 day Event monitor (returned on 12/30/20) with PAT/PAF self terminating episodes up to 15 beats.  She did see Dr. Keri Sosa again in follow-up 5/15/20. Was asked to continue warfarin + ASA 81, slong with Metoprolol XL. Had discussed possible EP referral at time of last visit with Dr. Keri Sosa )? ILR vs other, PAF).  Back down to 25mg of the Metoprolol XL. She did have a sleep study done 2/21/19 to r/o IJEOMA. The patient denies chest pain/ shortness of breath, orthopnea, PND, LE edema, syncope, presyncope or fatigue.        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 No family history on file. Social History     Socioeconomic History    Marital status:      Spouse name: Not on file    Number of children: Not on file    Years of education: Not on file    Highest education level: Not on file   Occupational History    Not on file   Tobacco Use    Smoking status: Never Smoker    Smokeless tobacco: Never Used   Substance and Sexual Activity    Alcohol use: Not on file    Drug use: Not on file    Sexual activity: Not on file   Other Topics Concern    Not on file   Social History Narrative    Not on file     Social Determinants of Health     Financial Resource Strain:     Difficulty of Paying Living Expenses: Not on file   Food Insecurity:     Worried About Running Out of Food in the Last Year: Not on file    Román of Food in the Last Year: Not on file   Transportation Needs:     Lack of Transportation (Medical): Not on file    Lack of Transportation (Non-Medical):  Not on file   Physical Activity:     Days of Exercise per Week: Not on file    Minutes of Exercise per Session: Not on file   Stress:     Feeling of Stress : Not on file   Social Connections:     Frequency of Communication with Friends and Family: Not on file    Frequency of Social Gatherings with Friends and Family: Not on file    Attends Worship Services: Not on file    Active Member of Clubs or Organizations: Not on file    Attends Club or Organization Meetings: Not on file    Marital Status: Not on file   Intimate Partner Violence:     Fear of Current or Ex-Partner: Not on file    Emotionally Abused: Not on file    Physically Abused: Not on file    Sexually Abused: Not on file   Housing Stability:     Unable to Pay for Housing in the Last Year: Not on file    Number of [place] in the Last Year: Not on file    Unstable Housing in the Last Year: Not on file      Current Outpatient Medications   Medication Sig    Eliquis 5 mg tablet TAKE 1 TABLET TWICE A DAY FOR TREATMENT TO PREVENT BLOOD CLOTS IN CHRONIC ATRIAL FIBRILLATION (WARFARIN DISCONTINUED)    metoprolol succinate (TOPROL-XL) 25 mg XL tablet TAKE 1 TABLET BY MOUTH ONCE DAILY    Synthroid 112 mcg tablet Take 112 mcg by mouth daily. No current facility-administered medications for this visit. Review of Symptoms:    CONST  No weight change. No fever, chills, sweats    ENT No visual changes, URI sx, sore throat    CV  See HPI   RESP  No cough, or sputum, wheezing. Also see HPI   GI  No abdominal pain or change in bowel habits. No heartburn or dysphagia. No melena or rectal bleeding.   No dysuria, urgency, frequency, hematuria   MSKEL  No joint pain, swelling. No muscle pain. SKIN  No rash or lesions. NEURO  No headache, syncope, or seizure. No weakness, loss of sensation, or paresthesias. PSYCH  No low mood or depression  No anxiety. HE/LYMPH  No easy bruising, abnormal bleeding, or enlarged glands. Physical ExamPhysical Exam:    Visit Vitals  /70 (BP 1 Location: Left arm, BP Patient Position: Sitting, BP Cuff Size: Adult)   Pulse 80   Temp 98.2 °F (36.8 °C) (Temporal)   Resp 18   Ht 5' 2\" (1.575 m)   Wt 136 lb (61.7 kg)   SpO2 98% Comment: ra   BMI 24.87 kg/m²     Gen: NAD  HEENT:  PERRL, throat clear  Neck: no adenopathy, no thyromegaly, no JVD   Heart:  Regular,Nl S1S2,  II/VI murmur, no gallop or rub. Lungs:  clear  Abdomen:   Soft, non-tender, bowel sounds are active.    Extremities:  No edema  Pulse: symmetric  Neuro: A&O times 3, No focal neuro deficits    Cardiographics    ECG: NSR, NST, no acute changes      Labs:   Lab Results   Component Value Date/Time    Sodium 142 09/24/2019 11:59 AM    Potassium 3.9 09/24/2019 11:59 AM    Chloride 104 09/24/2019 11:59 AM    CO2 29 09/24/2019 11:59 AM Anion gap 9 09/24/2019 11:59 AM    Glucose 88 09/24/2019 11:59 AM    BUN 12 09/24/2019 11:59 AM    Creatinine 0.69 09/24/2019 11:59 AM    BUN/Creatinine ratio 17 09/24/2019 11:59 AM    GFR est AA >60 09/24/2019 11:59 AM    GFR est non-AA >60 09/24/2019 11:59 AM    Calcium 9.4 09/24/2019 11:59 AM     No results found for: CPK, CPKX, CPX  No results found for: CHOL, CHOLX, CHLST, CHOLV, 296252, HDL, HDLP, LDL, LDLC, DLDLP, TGLX, TRIGL, TRIGP, CHHD, CHHDX  No results found for this or any previous visit. Assessment:      Echo 10/2021:  · LV: Estimated LVEF is 55 - 60%. Normal cavity size, wall thickness and systolic function (ejection fraction normal). Age-appropriate left ventricular diastolic function. · LA: Severely dilated left atrium. Left Atrium volume index is 53 mL/m2. · MV: Mitral valve is prosthetic. Mitral valve mean gradient is 5.1 mmHg. Mild mitral valve regurgitation is present. There is a unknown type bioprosthetic mitral valve. · IVC: Severely elevated central venous pressure (15 mmHg); IVC diameter is larger than 21 mm and collapses less than 50% with respiration.          Patient Active Problem List    Diagnosis Date Noted    S/P MVR (mitral valve replacement) 01/15/2020    S/P TVR (tricuspid valve repair) 01/15/2020    PAF (paroxysmal atrial fibrillation) ([address] 01/15/2020    Rheumatic heart disease     Mitral valve disease     Tricuspid regurgitation      Hx rheumatic fever as child, RHD with mod-severe /MR w/ prolapse, TR mod-severe)--s/p MV replacement ([place] Channel #27 pericardial) + Tricuspid valve repair (annuloplasty ring)--!0/2/19 at [facility] (Dr Neena Elliott), transient periop PAF--to NSR, seen in f/u by Dr. Daniel Batista (Cardiology)--on warfarin x 3 mos, and ASA 81.  Some tachy/palpitations and started low dose Toprol XL 25, increased up to 75mg (sinus tachy).  Echo 10/16/19 post op with LVEF 60, mild LAE, stable MV prosthesis and tricuspid annuloplasty.  Occasional palpitations.  Had 30 day Event monitor (returned on 12/30) with PAT/PAF self terminating episodes up to 15 beats.  She did see Dr. Brandyn Stubbs again in follow-up 5/15/20. Was asked to continue warfarin + ASA 81, slong with Metoprolol XL. Had discussed possible EP referral at time of last visit with Dr. Brandyn Stubbs )? ILR vs other, PAF).  Back down to 25mg of the Metoprolol XL. She did have a sleep study done 2/21/19 to r/o IJEOMA. Plan:     Doing well with no cardiac symptoms. Echo 10/2021, normal LVEF, stable prosthetic mitral valve, severe left atrial enlargement   continue current meds/rx  Advise that I acknowledge they are \"old wives tales,\" but she could try a glass of tonic water/diet tonic water at bedtime to prevent nocturnal cramps (contains quinine) and/or a spoonful of whole mustard-my patients swear by this  Lipids and labs followed by PCP.     Continue current care and f/u in 1 year, sooner as needed    Chema Jacques MD

## 2022-05-20 ENCOUNTER — OFFICE VISIT (OUTPATIENT)
Dept: CARDIOLOGY CLINIC | Age: 77
End: 2022-05-20
Payer: MEDICARE

## 2022-05-20 VITALS
HEART RATE: 80 BPM | RESPIRATION RATE: 18 BRPM | WEIGHT: 136 LBS | TEMPERATURE: 98.2 F | OXYGEN SATURATION: 98 % | DIASTOLIC BLOOD PRESSURE: 70 MMHG | BODY MASS INDEX: 25.03 KG/M2 | HEIGHT: 62 IN | SYSTOLIC BLOOD PRESSURE: 116 MMHG

## 2022-05-20 DIAGNOSIS — Z98.890 S/P TVR (TRICUSPID VALVE REPAIR): ICD-10-CM

## 2022-05-20 DIAGNOSIS — I48.0 PAF (PAROXYSMAL ATRIAL FIBRILLATION) (HCC): Primary | ICD-10-CM

## 2022-05-20 DIAGNOSIS — I09.9 RHEUMATIC HEART DISEASE: ICD-10-CM

## 2022-05-20 DIAGNOSIS — Z95.2 S/P MVR (MITRAL VALVE REPLACEMENT): ICD-10-CM

## 2022-05-20 PROCEDURE — G8427 DOCREV CUR MEDS BY ELIG CLIN: HCPCS | Performed by: INTERNAL MEDICINE

## 2022-05-20 PROCEDURE — G8510 SCR DEP NEG, NO PLAN REQD: HCPCS | Performed by: INTERNAL MEDICINE

## 2022-05-20 PROCEDURE — 1101F PT FALLS ASSESS-DOCD LE1/YR: CPT | Performed by: INTERNAL MEDICINE

## 2022-05-20 PROCEDURE — 3017F COLORECTAL CA SCREEN DOC REV: CPT | Performed by: INTERNAL MEDICINE

## 2022-05-20 PROCEDURE — G8536 NO DOC ELDER MAL SCRN: HCPCS | Performed by: INTERNAL MEDICINE

## 2022-05-20 PROCEDURE — 1090F PRES/ABSN URINE INCON ASSESS: CPT | Performed by: INTERNAL MEDICINE

## 2022-05-20 PROCEDURE — G8400 PT W/DXA NO RESULTS DOC: HCPCS | Performed by: INTERNAL MEDICINE

## 2022-05-20 PROCEDURE — 99214 OFFICE O/P EST MOD 30 MIN: CPT | Performed by: INTERNAL MEDICINE

## 2022-05-20 PROCEDURE — G8420 CALC BMI NORM PARAMETERS: HCPCS | Performed by: INTERNAL MEDICINE

## 2022-05-20 PROCEDURE — 93000 ELECTROCARDIOGRAM COMPLETE: CPT | Performed by: INTERNAL MEDICINE

## 2022-05-20 NOTE — LETTER
5/20/2022    Patient: Mary Dsouza   YOB: 1945   Date of Visit: 5/20/2022     Justine Darnell NP  [address] [facility] [address]  Via Fax: [phone]     Toney Benites, [address] [place] [address]  Via Fax: [phone]    Dear IVAN Dickinson MD,      Thank you for referring Ms. Angela Haro to [facility] [address] [place] [address] for evaluation. My notes for this consultation are attached. If you have questions, please do not hesitate to call me. I look forward to following your patient along with you.       Sincerely,    Jennifer Jordan MD
Emma Trujillo  (NP)  2022 15:49:12

## 2023-01-03 RX ORDER — METOPROLOL SUCCINATE 25 MG/1
TABLET, EXTENDED RELEASE ORAL
Qty: 90 TABLET | Refills: 0 | Status: SHIPPED | OUTPATIENT
Start: 2023-01-03

## 2023-01-03 NOTE — TELEPHONE ENCOUNTER
PCP: Zoila Cross NP    Last appt: 5/20/2022  Future Appointments   Date Time Provider [place]   5/24/2023  9:00 AM Marlin Bhatti, NP [facility] AM[facility]       Requested Prescriptions     Signed Prescriptions Disp Refills    metoprolol succinate (TOPROL-XL) 25 mg XL tablet 90 Tablet 0     Sig: TAKE 1 TABLET BY MOUTH ONCE DAILY     Authorizing Provider: Sidney Garibay     Ordering User: KHUSHBOO DAVIES         Other Comments:  Verbal order per provider. Order (medication, dose, route, frequency, amount, refills) repeated and verified twice.

## 2023-02-05 DIAGNOSIS — I48.0 PAF (PAROXYSMAL ATRIAL FIBRILLATION) (HCC): Primary | ICD-10-CM

## 2023-02-07 NOTE — TELEPHONE ENCOUNTER
PCP: Didier Moran NP    Last appt: 5/20/2022  Future Appointments   Date Time Provider [place]   5/24/2023  9:00 AM Aleja Bowen NP [facility]       Requested Prescriptions     Pending Prescriptions Disp Refills    Eliquis 5 mg tablet [Pharmacy Med Name: Sintia Vergara TAB 5MG] 180 Tablet 0     Sig: TAKE 1 TABLET TWICE A DAY FOR TREATMENT TO PREVENT BLOOD CLOTS IN CHRONIC ATRIAL FIBRILLATION (WARFARIN DISCONTINUED)         Other Comments:  Last labs 03/2021. Called pt (no answer). Left confidential message stating that a CBC can be completed at [facility] as earliest as tomorrow if in agreement.

## 2023-02-08 NOTE — TELEPHONE ENCOUNTER
Order placed per provider (CBC). PT to have lab drawn at pcp office or [facility] LAB. Verbal order per provider. Order (medication, dose, route, frequency, amount, refills) repeated and verified twice.

## 2023-02-09 ENCOUNTER — HOSPITAL ENCOUNTER (OUTPATIENT)
Dept: LAB | Age: 78
Discharge: HOME | End: 2023-02-09
Payer: MEDICARE

## 2023-02-09 LAB
BASOPHILS # BLD: 0.1 K/UL (ref 0–0.1)
BASOPHILS NFR BLD: 1 % (ref 0–1)
DIFFERENTIAL METHOD BLD: NORMAL
EOSINOPHIL # BLD: 0.1 K/UL (ref 0–0.4)
EOSINOPHIL NFR BLD: 3 % (ref 0–7)
ERYTHROCYTE [DISTWIDTH] IN BLOOD BY AUTOMATED COUNT: 13.7 % (ref 11.5–14.5)
HCT VFR BLD AUTO: 36.9 % (ref 35–47)
HGB BLD-MCNC: 11.8 G/DL (ref 11.5–16)
IMM GRANULOCYTES # BLD AUTO: 0 K/UL (ref 0–0.04)
IMM GRANULOCYTES NFR BLD AUTO: 0 % (ref 0–0.5)
LYMPHOCYTES # BLD: 1.5 K/UL (ref 0.8–3.5)
LYMPHOCYTES NFR BLD: 27 % (ref 12–49)
MCH RBC QN AUTO: 28.6 PG (ref 26–34)
MCHC RBC AUTO-ENTMCNC: 32 G/DL (ref 30–36.5)
MCV RBC AUTO: 89.3 FL (ref 80–99)
MONOCYTES # BLD: 0.7 K/UL (ref 0–1)
MONOCYTES NFR BLD: 12 % (ref 5–13)
NEUTS SEG # BLD: 3.1 K/UL (ref 1.8–8)
NEUTS SEG NFR BLD: 57 % (ref 32–75)
NRBC # BLD: 0 K/UL (ref 0–0.01)
NRBC BLD-RTO: 0 PER 100 WBC
PLATELET # BLD AUTO: 173 K/UL (ref 150–400)
PMV BLD AUTO: 9.9 FL (ref 8.9–12.9)
RBC # BLD AUTO: 4.13 M/UL (ref 3.8–5.2)
WBC # BLD AUTO: 5.5 K/UL (ref 3.6–11)

## 2023-02-09 PROCEDURE — 85025 COMPLETE CBC W/AUTO DIFF WBC: CPT

## 2023-02-09 PROCEDURE — 36415 COLL VENOUS BLD VENIPUNCTURE: CPT

## 2023-02-09 RX ORDER — APIXABAN 5 MG/1
TABLET, FILM COATED ORAL
Qty: 180 TABLET | Refills: 0 | Status: SHIPPED | OUTPATIENT
Start: 2023-02-09

## 2023-04-05 RX ORDER — METOPROLOL SUCCINATE 25 MG/1
25 TABLET, EXTENDED RELEASE ORAL DAILY
Qty: 90 TABLET | Refills: 0 | Status: SHIPPED
Start: 2023-04-05

## 2023-04-05 NOTE — TELEPHONE ENCOUNTER
PCP: Emily Donald NP    Last appt: 5/20/2022  Future Appointments   Date Time Provider [place]   5/26/2023  2:40 PM Judeen Canavan., NP [facility]       Requested Prescriptions     Pending Prescriptions Disp Refills    metoprolol succinate (TOPROL-XL) 25 mg XL tablet 90 Tablet 0     Sig: Take 1 Tablet by mouth daily. Other Comments:  Verbal order per provider. Order (medication, dose, route, frequency, amount, refills) repeated and verified twice.
No difficulties

## 2023-05-01 RX ORDER — APIXABAN 5 MG/1
TABLET, FILM COATED ORAL
Qty: 180 TABLET | Refills: 0 | Status: SHIPPED | OUTPATIENT
Start: 2023-05-01

## 2023-05-01 NOTE — TELEPHONE ENCOUNTER
PCP: Felipe Sargent NP    Last appt: 5/20/2022  APPT: 5/2023    Requested Prescriptions     Signed Prescriptions Disp Refills    Eliquis 5 mg tablet 180 Tablet 0     Sig: TAKE 1 TABLET TWICE A DAY FOR TREATMENT TO PREVENT BLOOD CLOTS IN CHRONIC ATRIAL FIBRILLATION (WARFARIN DISCONTINUED)     Authorizing Provider: Jesus Aponte     Ordering User: KHUSHBOO Rascon         Other Comments:  Verbal order per provider. Order (medication, dose, route, frequency, amount, refills) repeated and verified twice.

## 2023-05-22 NOTE — PROGRESS NOTES
[facility]     [address] [facility], [address]  [address] [facility], [address]  [address] [place], [address] [facility]     Subjective:          Jacques Loyd is a 78 y.o. female is here for routine f/u. Hx rheumatic  fever as child, RHD with mod-severe /MR w/ prolapse, TR mod-severe)--s/p MV replacement (Aura Exon #27 pericardial) + Tricuspid valve repair (annuloplasty ring)--10/2/19 at [facility] (Dr Nivia Cano), transient periop PAF--to NSR, seen in f/u  by Dr. Rosendo Cano (Cardiology)--on warfarin x 3 mos, and ASA 81. Some tachy/palpitations and started low dose Toprol XL 25, increased up to 75mg (sinus tachy). Echo 10 / 2021 with LVEF 60, mild LAE, stable MV prosthesis and tricuspid annuloplasty. Occasional  palpitations. Had 30 day Event monitor (returned on 12/30/20) with PAT/PAF self terminating episodes up to 15 beats. She did see Dr. Rosendo Cano again in follow-up 5/15/20. Was asked to continue warfarin + ASA 81, slong with Metoprolol XL. Had discussed  possible EP referral at time of last visit with Dr. Rosendo Cano )? ILR vs other, PAF). Back down to 25mg of the Metoprolol XL. She did have a sleep study done 2/21/19 to r/o VALERIA    Last seen by Dr Ave Chao in 5/2022    She is on [facility], reports no melena, hematuria, or obvious signs of bleeding. No falls. Has some mild occasional nonexertional cp, walking 10 miles almost daily without exertional symptoms. C/o leg cramps, + varicosities    The patient denies shortness of breath, orthopnea, PND, LE edema, palpitations, syncope, presyncope or fatigue.     Patient Active Problem List    Diagnosis Date Noted    S/P TVR (tricuspid valve repair) 01/15/2020    S/P MVR (mitral valve replacement) 01/15/2020    PAF (paroxysmal atrial fibrillation) ([address] 01/15/2020    Rheumatic heart disease     Mitral valve disease     Tricuspid regurgitation       Pernell Grime, APRN - NP  Past Medical History:   Diagnosis

## 2023-05-26 ENCOUNTER — OFFICE VISIT (OUTPATIENT)
Age: 78
End: 2023-05-26
Payer: MEDICARE

## 2023-05-26 VITALS
TEMPERATURE: 97.2 F | HEIGHT: 62 IN | BODY MASS INDEX: 25.4 KG/M2 | DIASTOLIC BLOOD PRESSURE: 80 MMHG | WEIGHT: 138 LBS | OXYGEN SATURATION: 98 % | HEART RATE: 84 BPM | RESPIRATION RATE: 22 BRPM | SYSTOLIC BLOOD PRESSURE: 124 MMHG

## 2023-05-26 DIAGNOSIS — R07.89 ATYPICAL CHEST PAIN: ICD-10-CM

## 2023-05-26 DIAGNOSIS — I09.9 RHEUMATIC HEART DISEASE, UNSPECIFIED: ICD-10-CM

## 2023-05-26 DIAGNOSIS — Z95.2 PRESENCE OF PROSTHETIC HEART VALVE: ICD-10-CM

## 2023-05-26 DIAGNOSIS — I48.0 PAROXYSMAL ATRIAL FIBRILLATION (HCC): Primary | ICD-10-CM

## 2023-05-26 DIAGNOSIS — I05.9 MITRAL VALVE DISEASE: ICD-10-CM

## 2023-05-26 DIAGNOSIS — Z98.890 S/P TVR (TRICUSPID VALVE REPAIR): ICD-10-CM

## 2023-05-26 DIAGNOSIS — E78.2 MIXED HYPERLIPIDEMIA: ICD-10-CM

## 2023-05-26 DIAGNOSIS — Z95.2 S/P MVR (MITRAL VALVE REPLACEMENT): ICD-10-CM

## 2023-05-26 PROCEDURE — 99214 OFFICE O/P EST MOD 30 MIN: CPT | Performed by: NURSE PRACTITIONER

## 2023-05-26 PROCEDURE — 1123F ACP DISCUSS/DSCN MKR DOCD: CPT | Performed by: NURSE PRACTITIONER

## 2023-05-26 ASSESSMENT — PATIENT HEALTH QUESTIONNAIRE - PHQ9
SUM OF ALL RESPONSES TO PHQ QUESTIONS 1-9: 0
SUM OF ALL RESPONSES TO PHQ QUESTIONS 1-9: 0
2. FEELING DOWN, DEPRESSED OR HOPELESS: 0
SUM OF ALL RESPONSES TO PHQ9 QUESTIONS 1 & 2: 0
SUM OF ALL RESPONSES TO PHQ QUESTIONS 1-9: 0
SUM OF ALL RESPONSES TO PHQ QUESTIONS 1-9: 0
1. LITTLE INTEREST OR PLEASURE IN DOING THINGS: 0

## 2023-05-26 NOTE — PROGRESS NOTES
Identified pt with two pt identifiers(name and ). Reviewed record in preparation for visit and have obtained necessary documentation. Chief Complaint   Patient presents with    1 Year Follow Up    Atrial Fibrillation     Paroxsymal afib       Valvular Heart Disease      /80 (Site: Left Upper Arm, Position: Sitting, Cuff Size: Medium Adult)   Pulse 84   Temp 97.2 °F (36.2 °C) (Temporal)   Resp 22   Ht 5' 2\" (1.575 m)   Wt 138 lb (62.6 kg)   SpO2 98%   BMI 25.24 kg/m²       Medications reviewed/approved by provider. Health Maintenance Review: Patient reminded of \"due or due soon\" health maintenance. I have asked the patient to contact his/her primary care provider (PCP) for follow-up on his/her health maintenance. Coordination of Care Questionnaire:  :   1) Have you been to an emergency room, urgent care, or hospitalized since your last visit? If yes, where when, and reason for visit? yes [address] [place] [address] 7.3.22 Paronychia of finger on right hand       2. Have seen or consulted any other health care provider since your last visit? If yes, where when, and reason for visit? yes - [place]       Patient is accompanied by self I have received verbal consent from [address] [place] [address] to discuss any/all medical information while they are present in the room.

## 2023-06-15 ENCOUNTER — TELEPHONE (OUTPATIENT)
Age: 78
End: 2023-06-15

## 2023-06-15 NOTE — TELEPHONE ENCOUNTER
Pt would like to know if she can have a late apt  on 7/18/23 with the doctor.     Please assist.    [phone]

## 2023-07-20 PROBLEM — Z79.01 CHRONIC ANTICOAGULATION: Status: ACTIVE | Noted: 2023-07-20

## 2023-07-26 ENCOUNTER — OFFICE VISIT (OUTPATIENT)
Age: 78
End: 2023-07-26
Payer: MEDICARE

## 2023-07-26 VITALS
SYSTOLIC BLOOD PRESSURE: 116 MMHG | OXYGEN SATURATION: 96 % | HEIGHT: 62 IN | HEART RATE: 83 BPM | DIASTOLIC BLOOD PRESSURE: 70 MMHG | BODY MASS INDEX: 25.4 KG/M2 | RESPIRATION RATE: 20 BRPM | TEMPERATURE: 97.7 F | WEIGHT: 138 LBS

## 2023-07-26 DIAGNOSIS — Z95.2 S/P MVR (MITRAL VALVE REPLACEMENT): Primary | ICD-10-CM

## 2023-07-26 DIAGNOSIS — I48.0 PAF (PAROXYSMAL ATRIAL FIBRILLATION) (HCC): ICD-10-CM

## 2023-07-26 DIAGNOSIS — Z98.890 S/P TVR (TRICUSPID VALVE REPAIR): ICD-10-CM

## 2023-07-26 DIAGNOSIS — Z79.01 CHRONIC ANTICOAGULATION: ICD-10-CM

## 2023-07-26 PROCEDURE — 99214 OFFICE O/P EST MOD 30 MIN: CPT | Performed by: INTERNAL MEDICINE

## 2023-07-26 PROCEDURE — 1123F ACP DISCUSS/DSCN MKR DOCD: CPT | Performed by: INTERNAL MEDICINE

## 2023-07-26 RX ORDER — LEVOTHYROXINE SODIUM 100 MCG
100 TABLET ORAL
COMMUNITY
Start: 2023-05-05

## 2023-07-26 ASSESSMENT — PATIENT HEALTH QUESTIONNAIRE - PHQ9
SUM OF ALL RESPONSES TO PHQ QUESTIONS 1-9: 0
SUM OF ALL RESPONSES TO PHQ9 QUESTIONS 1 & 2: 0
2. FEELING DOWN, DEPRESSED OR HOPELESS: 0
1. LITTLE INTEREST OR PLEASURE IN DOING THINGS: 0
SUM OF ALL RESPONSES TO PHQ QUESTIONS 1-9: 0

## 2023-08-07 NOTE — PROGRESS NOTES
Advised pt that renal labs are needed for recurrent eliquis refills. DUE 8/19/2022. Pt will go to pcp.

## 2023-08-07 NOTE — TELEPHONE ENCOUNTER
PCP: Nguyen Dickerson APRN - NP    Last appt: 7/26/2023   Future Appointments   Date Time Provider [address]   1/30/2024  9:40 AM Karin Hollingsworth MD [facility]       Requested Prescriptions     Signed Prescriptions Disp Refills    apixaban (ELIQUIS) 5 MG TABS tablet 180 tablet 0     Sig: Take 1 tablet by mouth 2 times daily Need updated labs from pcp. Authorizing Provider: Karin Hollingsworth     Ordering User: Kyle MILLAN         Other Comments:  Verbal order per provider. Order (medication, dose, route, frequency, amount, refills) repeated and verified twice.

## 2023-09-20 RX ORDER — METOPROLOL SUCCINATE 25 MG/1
25 TABLET, EXTENDED RELEASE ORAL DAILY
Qty: 90 TABLET | Refills: 1 | Status: SHIPPED | OUTPATIENT
Start: 2023-09-20

## 2023-09-20 NOTE — TELEPHONE ENCOUNTER
Verbal order per provider. Order (medication, dose, route, frequency, amount, refills) repeated and verified twice.

## 2023-09-21 ENCOUNTER — TELEPHONE (OUTPATIENT)
Age: 78
End: 2023-09-21

## 2023-09-21 NOTE — TELEPHONE ENCOUNTER
Left confidential message informing the pt of the following: It looks as though the data was not salvageable upon return. The data was corrupt. This can happen when the device has been damaged. There is no data available to produce a report. Let me know if you have any questions. With appreciation,     Samm Lee     Can perform service tomorrow.

## 2023-09-22 ENCOUNTER — TELEPHONE (OUTPATIENT)
Age: 78
End: 2023-09-22

## 2023-09-22 NOTE — TELEPHONE ENCOUNTER
Pt is rtc back to the nurse and she said she can't come into have the monitor placed today and would like a call back because she wants to schedule this for next week. Pt also stated if she can be called today she may actually be able to come into the office.       [phone] cell

## 2023-11-02 ENCOUNTER — TELEPHONE (OUTPATIENT)
Age: 78
End: 2023-11-02

## 2023-11-02 NOTE — TELEPHONE ENCOUNTER
PCP: JOSIAS Spivey - NP    Last appt: 7/26/2023   Future Appointments   Date Time Provider [address]   1/30/2024  9:40 AM Geo Prieto MD [facility]       Requested Prescriptions     Signed Prescriptions Disp Refills    apixaban (ELIQUIS) 5 MG TABS tablet 180 tablet 1     Sig: Take 1 tablet by mouth 2 times daily     Authorizing Provider: Geo Prieto     Ordering User: SHANLEL Owen         Other Comments:  Verbal order per provider. Order (medication, dose, route, frequency, amount, refills) repeated and verified twice.

## 2023-11-02 NOTE — TELEPHONE ENCOUNTER
Patient called in and was verified with two patient identifiers. Patient called in and is asking for a refill of her Eliquis 5mg sent to the mail order [facility].     Please advise

## 2024-01-23 NOTE — PROGRESS NOTES
ASSESSMENT and PLAN  1. 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  Normally functioning bioprosthetic mitral valve and intact tricuspid valve repair on echo 6/13/2023.  Continue to follow with serial echo studies.  SBE prophylaxis indicated.    2. 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 Normally functioning bioprosthetic mitral valve and intact tricuspid valve repair on echo 6/13/2023.  Continue to follow with serial echo studies.  SBE prophylaxis indicated.    3. PAF (paroxysmal atrial fibrillation) ([facility])  No recurrence on 2-week monitor 9/2023.  Brief atrial runs but asymptomatic.  Continue metoprolol at the current dose. Continue anticoagulation for stroke prophylaxis.    4. Chronic anticoagulation  No bleeding complications reported on Eliquis.       Follow-up in 6 months.  The patient has been instructed and agrees to call our office with any issues or other concerns related to their cardiac condition(s) and/or complaint(s).      CHIEF COMPLAINT  Routine follow-up.    HPI:    Shannon Goldstein is a 78 y.o. female here for follow-up of valvular heart disease.  At the previous visit on 7/26/2023, the patient reported feeling irregular heartbeat at times similar to how she felt when she had atrial fibrillation in the past.  Her Apple Watch at those times read heart rate in the 50s with no notification of abnormal heart rhythm.  A subsequent 2-week monitor demonstrated occasional PACs, brief atrial runs but no clear atrial fibrillation or atrial flutter.  No changes recommended.  She returns today for follow-up.  No cardiac issues have developed in the interim.  She exercises without limitation.  She denies chest pain, dyspnea, orthopnea, PND, syncope or near syncope.  She gets ankle swelling at the end of the day that

## 2024-01-30 ENCOUNTER — OFFICE VISIT (OUTPATIENT)
Age: 79
End: 2024-01-30
Payer: MEDICARE

## 2024-01-30 VITALS
HEIGHT: 62 IN | DIASTOLIC BLOOD PRESSURE: 70 MMHG | WEIGHT: 142 LBS | OXYGEN SATURATION: 94 % | BODY MASS INDEX: 26.13 KG/M2 | SYSTOLIC BLOOD PRESSURE: 118 MMHG | TEMPERATURE: 98.2 F | RESPIRATION RATE: 20 BRPM | HEART RATE: 84 BPM

## 2024-01-30 DIAGNOSIS — Z79.01 CHRONIC ANTICOAGULATION: ICD-10-CM

## 2024-01-30 DIAGNOSIS — Z98.890 S/P TVR (TRICUSPID VALVE REPAIR): ICD-10-CM

## 2024-01-30 DIAGNOSIS — I48.0 PAF (PAROXYSMAL ATRIAL FIBRILLATION) (HCC): ICD-10-CM

## 2024-01-30 DIAGNOSIS — Z95.2 S/P MVR (MITRAL VALVE REPLACEMENT): Primary | ICD-10-CM

## 2024-01-30 PROCEDURE — G8428 CUR MEDS NOT DOCUMENT: HCPCS | Performed by: INTERNAL MEDICINE

## 2024-01-30 PROCEDURE — 1090F PRES/ABSN URINE INCON ASSESS: CPT | Performed by: INTERNAL MEDICINE

## 2024-01-30 PROCEDURE — 99214 OFFICE O/P EST MOD 30 MIN: CPT | Performed by: INTERNAL MEDICINE

## 2024-01-30 PROCEDURE — 1036F TOBACCO NON-USER: CPT | Performed by: INTERNAL MEDICINE

## 2024-01-30 PROCEDURE — G8419 CALC BMI OUT NRM PARAM NOF/U: HCPCS | Performed by: INTERNAL MEDICINE

## 2024-01-30 PROCEDURE — G8400 PT W/DXA NO RESULTS DOC: HCPCS | Performed by: INTERNAL MEDICINE

## 2024-01-30 PROCEDURE — G8484 FLU IMMUNIZE NO ADMIN: HCPCS | Performed by: INTERNAL MEDICINE

## 2024-01-30 PROCEDURE — 1123F ACP DISCUSS/DSCN MKR DOCD: CPT | Performed by: INTERNAL MEDICINE

## 2024-01-30 ASSESSMENT — PATIENT HEALTH QUESTIONNAIRE - PHQ9
SUM OF ALL RESPONSES TO PHQ9 QUESTIONS 1 & 2: 0
2. FEELING DOWN, DEPRESSED OR HOPELESS: 0
SUM OF ALL RESPONSES TO PHQ QUESTIONS 1-9: 0
1. LITTLE INTEREST OR PLEASURE IN DOING THINGS: 0

## 2024-01-30 NOTE — PROGRESS NOTES
Identified pt with two pt identifiers(name and ). Reviewed record in preparation for visit and have obtained necessary documentation.  Chief Complaint   Patient presents with    Atrial Fibrillation    Valvular Heart Disease      /70 (Site: Left Upper Arm, Position: Sitting, Cuff Size: Medium Adult)   Pulse 84   Temp 98.2 °F (36.8 °C) (Temporal)   Resp 20   Ht 1.575 m (5' 2\")   Wt 64.4 kg (142 lb)   SpO2 94%   BMI 25.97 kg/m²       Medications reviewed/approved by provider.      Health Maintenance Review: Patient reminded of \"due or due soon\" health maintenance. I have asked the patient to contact his/her primary care provider (PCP) for follow-up on his/her health maintenance.    Coordination of Care Questionnaire:  :   1) Have you been to an emergency room, urgent care, or hospitalized since your last visit?  If yes, where when, and reason for visit? Yes [facility] for lower abdominal pains on 23      2. Have seen or consulted any other health care provider since your last visit?   If yes, where when, and reason for visit?  yes - [facility] ER visit, Ruben - PCP       Patient is accompanied by self I have received verbal consent from Shannon Goldstein to discuss any/all medical information while they are present in the room.

## 2024-03-18 RX ORDER — METOPROLOL SUCCINATE 25 MG/1
25 TABLET, EXTENDED RELEASE ORAL DAILY
Qty: 90 TABLET | Refills: 1 | Status: SHIPPED | OUTPATIENT
Start: 2024-03-18

## 2024-03-18 NOTE — TELEPHONE ENCOUNTER
Requested Prescriptions     Signed Prescriptions Disp Refills    metoprolol succinate (TOPROL XL) 25 MG extended release tablet 90 tablet 1     Sig: Take 1 tablet by mouth daily     Authorizing Provider: DAVID ARAMBULA     Ordering User: MELISSA RENAE

## 2024-04-30 NOTE — TELEPHONE ENCOUNTER
Requested Prescriptions     Signed Prescriptions Disp Refills    apixaban (ELIQUIS) 5 MG TABS tablet 180 tablet 1     Sig: Take 1 tablet by mouth 2 times daily     Authorizing Provider: DAVID ARAMBULA     Ordering User: MELISSA RENAE

## 2024-08-26 RX ORDER — METOPROLOL SUCCINATE 25 MG/1
25 TABLET, EXTENDED RELEASE ORAL DAILY
Qty: 90 TABLET | Refills: 1 | Status: SHIPPED | OUTPATIENT
Start: 2024-08-26

## 2024-08-26 NOTE — TELEPHONE ENCOUNTER
PCP: Janis Miranda, APRN - NP    Last appt: 1/30/2024   Future Appointments   Date Time Provider Department Center   9/9/2024  9:40 AM David Arambula MD [facility]       Requested Prescriptions     Signed Prescriptions Disp Refills    metoprolol succinate (TOPROL XL) 25 MG extended release tablet 90 tablet 1     Sig: Take 1 tablet by mouth daily     Authorizing Provider: DAVID ARAMBULA     Ordering User: SHANELL VARELA         Other Comments:  Verbal order per provider.  Order (medication, dose, route, frequency, amount, refills) repeated and verified twice.

## 2024-09-09 ENCOUNTER — OFFICE VISIT (OUTPATIENT)
Age: 79
End: 2024-09-09
Payer: MEDICARE

## 2024-09-09 VITALS
HEART RATE: 75 BPM | BODY MASS INDEX: 25.4 KG/M2 | HEIGHT: 62 IN | SYSTOLIC BLOOD PRESSURE: 130 MMHG | DIASTOLIC BLOOD PRESSURE: 80 MMHG | RESPIRATION RATE: 18 BRPM | WEIGHT: 138 LBS | TEMPERATURE: 97 F | OXYGEN SATURATION: 98 %

## 2024-09-09 DIAGNOSIS — Z79.01 CHRONIC ANTICOAGULATION: ICD-10-CM

## 2024-09-09 DIAGNOSIS — Z98.890 S/P TVR (TRICUSPID VALVE REPAIR): ICD-10-CM

## 2024-09-09 DIAGNOSIS — Z95.2 S/P MVR (MITRAL VALVE REPLACEMENT): Primary | ICD-10-CM

## 2024-09-09 DIAGNOSIS — I48.0 PAF (PAROXYSMAL ATRIAL FIBRILLATION) (HCC): ICD-10-CM

## 2024-09-09 PROCEDURE — 99214 OFFICE O/P EST MOD 30 MIN: CPT | Performed by: INTERNAL MEDICINE

## 2024-09-09 PROCEDURE — G8400 PT W/DXA NO RESULTS DOC: HCPCS | Performed by: INTERNAL MEDICINE

## 2024-09-09 PROCEDURE — 1123F ACP DISCUSS/DSCN MKR DOCD: CPT | Performed by: INTERNAL MEDICINE

## 2024-09-09 PROCEDURE — 1090F PRES/ABSN URINE INCON ASSESS: CPT | Performed by: INTERNAL MEDICINE

## 2024-09-09 PROCEDURE — G8428 CUR MEDS NOT DOCUMENT: HCPCS | Performed by: INTERNAL MEDICINE

## 2024-09-09 PROCEDURE — 93000 ELECTROCARDIOGRAM COMPLETE: CPT | Performed by: INTERNAL MEDICINE

## 2024-09-09 PROCEDURE — 1036F TOBACCO NON-USER: CPT | Performed by: INTERNAL MEDICINE

## 2024-09-09 PROCEDURE — G8419 CALC BMI OUT NRM PARAM NOF/U: HCPCS | Performed by: INTERNAL MEDICINE

## 2024-09-09 ASSESSMENT — PATIENT HEALTH QUESTIONNAIRE - PHQ9
2. FEELING DOWN, DEPRESSED OR HOPELESS: NOT AT ALL
SUM OF ALL RESPONSES TO PHQ QUESTIONS 1-9: 0
1. LITTLE INTEREST OR PLEASURE IN DOING THINGS: NOT AT ALL
SUM OF ALL RESPONSES TO PHQ9 QUESTIONS 1 & 2: 0
SUM OF ALL RESPONSES TO PHQ QUESTIONS 1-9: 0

## 2024-09-30 ENCOUNTER — HOSPITAL ENCOUNTER (OUTPATIENT)
Facility: HOSPITAL | Age: 79
Discharge: HOME | End: 2024-10-03
Attending: INTERNAL MEDICINE
Payer: MEDICARE

## 2024-09-30 DIAGNOSIS — Z98.890 S/P TVR (TRICUSPID VALVE REPAIR): ICD-10-CM

## 2024-09-30 DIAGNOSIS — Z95.2 S/P MVR (MITRAL VALVE REPLACEMENT): ICD-10-CM

## 2024-09-30 LAB
ECHO AO ASC DIAM: 3.6 CM
ECHO AO ROOT DIAM: 3.3 CM
ECHO AR MAX VEL PISA: 3.3 M/S
ECHO AV PEAK GRADIENT: 11 MMHG
ECHO AV PEAK VELOCITY: 1.6 M/S
ECHO AV REGURGITANT PHT: 444.4 MILLISECOND
ECHO EST RA PRESSURE: 3 MMHG
ECHO LA DIAMETER: 5 CM
ECHO LA TO AORTIC ROOT RATIO: 1.52
ECHO LA VOL A-L A2C: 167 ML (ref 22–52)
ECHO LA VOL A-L A4C: 140 ML (ref 22–52)
ECHO LA VOL MOD A2C: 161 ML (ref 22–52)
ECHO LA VOL MOD A4C: 130 ML (ref 22–52)
ECHO LA VOLUME AREA LENGTH: 157 ML
ECHO LV E' LATERAL VELOCITY: 8.8 CM/S
ECHO LV E' SEPTAL VELOCITY: 7.4 CM/S
ECHO LV EDV A2C: 109 ML
ECHO LV EDV A4C: 132 ML
ECHO LV EDV BP: 121 ML (ref 56–104)
ECHO LV EF PHYSICIAN: 60 %
ECHO LV EJECTION FRACTION A2C: 54 %
ECHO LV EJECTION FRACTION A4C: 56 %
ECHO LV ESV A2C: 50 ML
ECHO LV ESV A4C: 59 ML
ECHO LV ESV BP: 55 ML (ref 19–49)
ECHO LV FRACTIONAL SHORTENING: 16 % (ref 28–44)
ECHO LV INTERNAL DIMENSION DIASTOLIC: 5.1 CM (ref 3.9–5.3)
ECHO LV INTERNAL DIMENSION SYSTOLIC: 4.3 CM
ECHO LV IVSD: 0.9 CM (ref 0.6–0.9)
ECHO LV MASS 2D: 163.6 G (ref 67–162)
ECHO LV POSTERIOR WALL DIASTOLIC: 0.9 CM (ref 0.6–0.9)
ECHO LV RELATIVE WALL THICKNESS RATIO: 0.35
ECHO LVOT AREA: 2.8 CM2
ECHO LVOT DIAM: 1.9 CM
ECHO MV A VELOCITY: 1.5 M/S
ECHO MV AREA PHT: 2.4 CM2
ECHO MV E DECELERATION TIME (DT): 258.1 MS
ECHO MV E VELOCITY: 1.61 M/S
ECHO MV E/A RATIO: 1.07
ECHO MV E/E' LATERAL: 18.3
ECHO MV E/E' RATIO (AVERAGED): 20.03
ECHO MV E/E' SEPTAL: 21.76
ECHO MV MAX VELOCITY: 1.9 M/S
ECHO MV MEAN GRADIENT: 6 MMHG
ECHO MV MEAN VELOCITY: 1.1 M/S
ECHO MV PEAK GRADIENT: 14 MMHG
ECHO MV PRESSURE HALF TIME (PHT): 92.8 MS
ECHO MV VTI: 62 CM
ECHO PULMONARY ARTERY END DIASTOLIC PRESSURE: 6 MMHG
ECHO PULMONARY ARTERY SYSTOLIC PRESSURE (PASP): 38 MMHG
ECHO PV REGURGITANT MAX VELOCITY: 1.2 M/S
ECHO RA AREA 4C: 15.9 CM2
ECHO RIGHT VENTRICULAR SYSTOLIC PRESSURE (RVSP): 34 MMHG
ECHO RV BASAL DIMENSION: 3.9 CM
ECHO RV TAPSE: 2.2 CM (ref 1.7–?)
ECHO TV REGURGITANT MAX VELOCITY: 2.8 M/S
ECHO TV REGURGITANT PEAK GRADIENT: 31 MMHG

## 2024-09-30 PROCEDURE — 93306 TTE W/DOPPLER COMPLETE: CPT | Performed by: INTERNAL MEDICINE

## 2024-09-30 PROCEDURE — 93306 TTE W/DOPPLER COMPLETE: CPT

## 2024-09-30 NOTE — RESULT ENCOUNTER NOTE
The echo looks good.  The mitral valve placement and tricuspid valve repair remain intact.  The heart function is normal.  Overall, no significant changes compared to the previous echo 6/13/2023.  Continue current therapy.

## 2025-01-30 RX ORDER — METOPROLOL SUCCINATE 25 MG/1
25 TABLET, EXTENDED RELEASE ORAL DAILY
Qty: 90 TABLET | Refills: 1 | Status: SHIPPED | OUTPATIENT
Start: 2025-01-30

## 2025-01-30 NOTE — TELEPHONE ENCOUNTER
PCP: Janis Miranda, APRN - NP    Last appt: 9/9/2024   Future Appointments   Date Time Provider Department Center   4/21/2025  9:40 AM David Arambula MD [facility]       Requested Prescriptions     Signed Prescriptions Disp Refills    metoprolol succinate (TOPROL XL) 25 MG extended release tablet 90 tablet 1     Sig: Take 1 tablet by mouth daily     Authorizing Provider: DAVID ARAMBULA     Ordering User: SHANELL VARELA         Other Comments:  Verbal order per provider.  Order (medication, dose, route, frequency, amount, refills) repeated and verified twice.

## 2025-04-10 NOTE — PROGRESS NOTES
ASSESSMENT and PLAN  1. 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  Normally functioning bioprosthetic MV and intact TV repair on echo 9/30/2024.  SBE prophylaxis indicated.    2. 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 Normally functioning bioprosthetic MV and intact TV repair on echo 9/30/2024.  SBE prophylaxis indicated.    3. PAF (paroxysmal atrial fibrillation) ([facility])  No recurrence on 2-week monitor 9/2023.  Brief atrial runs but asymptomatic.  Continue metoprolol at the current dose. Continue anticoagulation for stroke prophylaxis.    4. Chronic anticoagulation  No bleeding complications reported on Eliquis.       Follow-up in 6 months. The patient has been instructed and agrees to call our office with any issues or other concerns related to their cardiac condition(s) and/or complaint(s).      CHIEF COMPLAINT  Routine follow-up.    HPI:    Shannon Goldstein is a 79 y.o. female here for follow-up of valvular heart disease.  No cardiac issues have developed since the last visit on 9/9/2024.  Echo updated on 9/30/2024 demonstrated EF 60%, normally functioning bioprosthetic mitral valve with mean gradient 6 mmHg and mild MR and intact tricuspid valve repair with mild TR.  The study was not significantly changed from the previous echo in 6/2023.  She denies chest pain, dyspnea, orthopnea, PND, syncope or near syncope.  She exercises and walks regularly without limiting symptoms.  She denies bleeding complications related to Eliquis.    PERTINENT CARDIAC HISTORY: (Records reviewed and summarized below)  Rheumatic heart disease  ==> s/p bioprosthetic MV replacement and TV repair, 2019, [facility]  ==> Echo 10/28/2021 EF 60%, 1+ AR, MVR (mean 5 mmHg, 1+ MR), 1+ TR, RV nl, PASP 40  ==> Echo 6/13/2023, EF 65%,

## 2025-04-21 ENCOUNTER — OFFICE VISIT (OUTPATIENT)
Age: 80
End: 2025-04-21
Payer: MEDICARE

## 2025-04-21 VITALS
RESPIRATION RATE: 18 BRPM | BODY MASS INDEX: 25.76 KG/M2 | SYSTOLIC BLOOD PRESSURE: 144 MMHG | HEART RATE: 79 BPM | TEMPERATURE: 97.9 F | WEIGHT: 140 LBS | OXYGEN SATURATION: 97 % | DIASTOLIC BLOOD PRESSURE: 74 MMHG | HEIGHT: 62 IN

## 2025-04-21 DIAGNOSIS — Z79.01 CHRONIC ANTICOAGULATION: ICD-10-CM

## 2025-04-21 DIAGNOSIS — I48.0 PAF (PAROXYSMAL ATRIAL FIBRILLATION) (HCC): ICD-10-CM

## 2025-04-21 DIAGNOSIS — Z98.890 S/P TVR (TRICUSPID VALVE REPAIR): ICD-10-CM

## 2025-04-21 DIAGNOSIS — Z95.2 S/P MVR (MITRAL VALVE REPLACEMENT): Primary | ICD-10-CM

## 2025-04-21 PROCEDURE — 99214 OFFICE O/P EST MOD 30 MIN: CPT | Performed by: INTERNAL MEDICINE

## 2025-04-21 PROCEDURE — G8400 PT W/DXA NO RESULTS DOC: HCPCS | Performed by: INTERNAL MEDICINE

## 2025-04-21 PROCEDURE — G8419 CALC BMI OUT NRM PARAM NOF/U: HCPCS | Performed by: INTERNAL MEDICINE

## 2025-04-21 PROCEDURE — G8428 CUR MEDS NOT DOCUMENT: HCPCS | Performed by: INTERNAL MEDICINE

## 2025-04-21 PROCEDURE — 1036F TOBACCO NON-USER: CPT | Performed by: INTERNAL MEDICINE

## 2025-04-21 PROCEDURE — 1126F AMNT PAIN NOTED NONE PRSNT: CPT | Performed by: INTERNAL MEDICINE

## 2025-04-21 PROCEDURE — 1090F PRES/ABSN URINE INCON ASSESS: CPT | Performed by: INTERNAL MEDICINE

## 2025-04-21 PROCEDURE — 1123F ACP DISCUSS/DSCN MKR DOCD: CPT | Performed by: INTERNAL MEDICINE

## 2025-04-21 NOTE — PROGRESS NOTES
Identified pt with two pt identifiers(name and ). Reviewed record in preparation for visit and have obtained necessary documentation.  Chief Complaint   Patient presents with    Atrial Fibrillation      BP (!) 144/74 (BP Site: Left Upper Arm, Patient Position: Sitting, BP Cuff Size: Medium Adult)   Pulse 79   Temp 97.9 °F (36.6 °C) (Temporal)   Resp 18   Ht 1.575 m (5' 2\")   Wt 63.5 kg (140 lb)   SpO2 97%   BMI 25.61 kg/m²       Medications reviewed/approved by provider.      Health Maintenance Review: Patient reminded of \"due or due soon\" health maintenance. I have asked the patient to contact his/her primary care provider (PCP) for follow-up on his/her health maintenance.    Coordination of Care Questionnaire:  :   1) Have you been to an emergency room, urgent care, or hospitalized since your last visit?  If yes, where when, and reason for visit? no       2. Have seen or consulted any other health care provider since your last visit?   If yes, where when, and reason for visit?  no      Patient is accompanied by self I have received verbal consent from Shannon Goldstein to discuss any/all medical information while they are present in the room.

## 2025-04-28 NOTE — TELEPHONE ENCOUNTER
PCP: Janis Miranda, APRN - NP    Last appt: 4/21/2025   Future Appointments   Date Time Provider Department Center   12/2/2025  9:20 AM David Arambula MD [facility]       Requested Prescriptions     Signed Prescriptions Disp Refills    apixaban (ELIQUIS) 5 MG TABS tablet 180 tablet 1     Sig: Take 1 tablet by mouth 2 times daily     Authorizing Provider: DAVID ARAMBULA     Ordering User: SHANELL VARELA         Other Comments:  Verbal order per provider.  Order (medication, dose, route, frequency, amount, refills) repeated and verified twice.

## 2025-08-11 ENCOUNTER — TELEPHONE (OUTPATIENT)
Age: 80
End: 2025-08-11

## 2025-08-18 PROBLEM — I42.8 NONISCHEMIC CARDIOMYOPATHY (HCC): Status: ACTIVE | Noted: 2025-08-18

## 2025-08-18 PROBLEM — Z79.899 ON AMIODARONE THERAPY: Status: ACTIVE | Noted: 2025-08-18

## 2025-08-26 ENCOUNTER — TELEPHONE (OUTPATIENT)
Age: 80
End: 2025-08-26